# Patient Record
Sex: MALE | Race: WHITE | NOT HISPANIC OR LATINO | URBAN - METROPOLITAN AREA
[De-identification: names, ages, dates, MRNs, and addresses within clinical notes are randomized per-mention and may not be internally consistent; named-entity substitution may affect disease eponyms.]

---

## 2023-09-25 LAB — SARS-COV-2 RESULT: NOT DETECTED

## 2024-05-06 ENCOUNTER — OFFICE VISIT (OUTPATIENT)
Dept: PRIMARY CARE | Facility: CLINIC | Age: 29
End: 2024-05-06
Payer: COMMERCIAL

## 2024-05-06 VITALS
OXYGEN SATURATION: 98 % | HEART RATE: 78 BPM | WEIGHT: 194 LBS | SYSTOLIC BLOOD PRESSURE: 124 MMHG | DIASTOLIC BLOOD PRESSURE: 78 MMHG | RESPIRATION RATE: 16 BRPM

## 2024-05-06 DIAGNOSIS — L05.91 PILONIDAL CYST: ICD-10-CM

## 2024-05-06 DIAGNOSIS — K43.2 INCISIONAL HERNIA, WITHOUT OBSTRUCTION OR GANGRENE: ICD-10-CM

## 2024-05-06 DIAGNOSIS — Z13.220 LIPID SCREENING: ICD-10-CM

## 2024-05-06 DIAGNOSIS — Z11.59 NEED FOR HEPATITIS C SCREENING TEST: ICD-10-CM

## 2024-05-06 DIAGNOSIS — F32.A DEPRESSION, UNSPECIFIED DEPRESSION TYPE: ICD-10-CM

## 2024-05-06 DIAGNOSIS — J45.20 MILD INTERMITTENT ASTHMA WITHOUT COMPLICATION (HHS-HCC): Primary | ICD-10-CM

## 2024-05-06 DIAGNOSIS — Z76.89 ESTABLISHING CARE WITH NEW DOCTOR, ENCOUNTER FOR: ICD-10-CM

## 2024-05-06 PROBLEM — R94.31 PROLONGED QT INTERVAL: Status: RESOLVED | Noted: 2020-03-16 | Resolved: 2024-05-06

## 2024-05-06 PROBLEM — K86.89: Status: ACTIVE | Noted: 2020-03-16

## 2024-05-06 PROBLEM — K85.91 ACUTE NECROTIZING PANCREATITIS (HHS-HCC): Status: RESOLVED | Noted: 2020-03-16 | Resolved: 2024-05-06

## 2024-05-06 PROBLEM — G89.18 ACUTE POSTOPERATIVE PAIN: Status: RESOLVED | Noted: 2020-03-16 | Resolved: 2024-05-06

## 2024-05-06 PROBLEM — D62 ACUTE BLOOD LOSS ANEMIA: Status: RESOLVED | Noted: 2020-03-16 | Resolved: 2024-05-06

## 2024-05-06 PROBLEM — K65.9 ABDOMINAL INFECTION (MULTI): Status: RESOLVED | Noted: 2020-03-25 | Resolved: 2024-05-06

## 2024-05-06 PROBLEM — E43 SEVERE PROTEIN-CALORIE MALNUTRITION (MULTI): Status: RESOLVED | Noted: 2020-04-10 | Resolved: 2024-05-06

## 2024-05-06 PROBLEM — D64.9 ANEMIA: Status: ACTIVE | Noted: 2020-04-13

## 2024-05-06 PROBLEM — E44.0 MODERATE PROTEIN-CALORIE MALNUTRITION (MULTI): Status: RESOLVED | Noted: 2020-03-26 | Resolved: 2024-05-06

## 2024-05-06 PROBLEM — E66.9 OBESE: Status: RESOLVED | Noted: 2019-09-07 | Resolved: 2024-05-06

## 2024-05-06 PROBLEM — Z91.51 HISTORY OF SUICIDE ATTEMPT: Status: RESOLVED | Noted: 2020-03-16 | Resolved: 2024-05-06

## 2024-05-06 PROBLEM — M79.89 LEFT LEG SWELLING: Status: RESOLVED | Noted: 2020-03-16 | Resolved: 2024-05-06

## 2024-05-06 PROBLEM — J45.909 ASTHMA (HHS-HCC): Status: ACTIVE | Noted: 2019-12-31

## 2024-05-06 PROCEDURE — 1036F TOBACCO NON-USER: CPT | Performed by: PHYSICIAN ASSISTANT

## 2024-05-06 PROCEDURE — 99204 OFFICE O/P NEW MOD 45 MIN: CPT | Performed by: PHYSICIAN ASSISTANT

## 2024-05-06 RX ORDER — ALBUTEROL SULFATE 90 UG/1
1-2 AEROSOL, METERED RESPIRATORY (INHALATION) EVERY 6 HOURS PRN
Qty: 18 G | Refills: 5 | Status: SHIPPED | OUTPATIENT
Start: 2024-05-06

## 2024-05-06 RX ORDER — ALBUTEROL SULFATE 90 UG/1
AEROSOL, METERED RESPIRATORY (INHALATION)
COMMUNITY
Start: 2021-09-07 | End: 2024-05-06 | Stop reason: SDUPTHER

## 2024-05-06 ASSESSMENT — ENCOUNTER SYMPTOMS
DEPRESSION: 0
NEUROLOGICAL NEGATIVE: 1
SHORTNESS OF BREATH: 0
RESPIRATORY NEGATIVE: 1
PALPITATIONS: 0
ARTHRALGIAS: 0
NERVOUS/ANXIOUS: 0
GASTROINTESTINAL NEGATIVE: 1
ENDOCRINE NEGATIVE: 1
PSYCHIATRIC NEGATIVE: 1
LOSS OF SENSATION IN FEET: 0
CARDIOVASCULAR NEGATIVE: 1
CONSTITUTIONAL NEGATIVE: 1
OCCASIONAL FEELINGS OF UNSTEADINESS: 0
EYES NEGATIVE: 1
VOMITING: 0
HEADACHES: 0
WHEEZING: 0
BACK PAIN: 0
COUGH: 0
HEMATOLOGIC/LYMPHATIC NEGATIVE: 1
NAUSEA: 0
DIZZINESS: 0
ABDOMINAL PAIN: 0
ALLERGIC/IMMUNOLOGIC NEGATIVE: 1
MUSCULOSKELETAL NEGATIVE: 1

## 2024-05-06 ASSESSMENT — PATIENT HEALTH QUESTIONNAIRE - PHQ9
SUM OF ALL RESPONSES TO PHQ9 QUESTIONS 1 AND 2: 0
1. LITTLE INTEREST OR PLEASURE IN DOING THINGS: NOT AT ALL
2. FEELING DOWN, DEPRESSED OR HOPELESS: NOT AT ALL

## 2024-05-06 NOTE — PROGRESS NOTES
Subjective   Patient ID: Que Cha is a 29 y.o. male who presents for New Patient Visit.    HPI   Patient Que Cha 29 y.o. male is here today to establish care   previous PCP [facility]    single, works as  at [employer]  specialists none   UTD dental and vision - glasses uTD     PMhx significant medical hx asthma, MDD c/b 2 previous suicide attempts, alcohol use d/o - necrotizing pancreatitis   \asthma stable -  albuterol as  needed   Depression - stable - no medications- sees therapist   Hx pilonidal cyst in past  Incisional hernia -  not painful       PShx: abdominal colectomy     Social hx: etoh- none since 2021, no tobacco use, no illicit drug use   No specific diet and exercise- walks   immunizations   FMhx: mother HTN -- father healthy   Past medical, surgical, social, and family history all reviewed       patient states feeling well otherwise  denies fever, chills, N/V, headache, dizziness, CP, SOB, palpitations, edema, numbness, tingling, weakness  A chaperone was offered to the patient for the physical exam /  exam and was declined       Review of Systems   Constitutional: Negative.    HENT: Negative.     Eyes: Negative.    Respiratory: Negative.  Negative for cough, shortness of breath and wheezing.    Cardiovascular: Negative.  Negative for chest pain and palpitations.   Gastrointestinal: Negative.  Negative for abdominal pain, nausea and vomiting.   Endocrine: Negative.    Genitourinary: Negative.    Musculoskeletal: Negative.  Negative for arthralgias and back pain.   Skin: Negative.  Negative for rash.   Allergic/Immunologic: Negative.    Neurological: Negative.  Negative for dizziness and headaches.   Hematological: Negative.    Psychiatric/Behavioral: Negative.  The patient is not nervous/anxious.        Objective   /78 (BP Location: Right arm, Patient Position: Sitting)   Pulse 78   Resp 16   Wt 88 kg (194 lb 0.1 oz)   SpO2 98%     Physical Exam  Vitals and nursing note  reviewed.   Constitutional:       Appearance: Normal appearance.   HENT:      Head: Normocephalic and atraumatic.   Cardiovascular:      Rate and Rhythm: Normal rate and regular rhythm.      Heart sounds: Normal heart sounds.   Pulmonary:      Effort: Pulmonary effort is normal.      Breath sounds: Normal breath sounds.   Abdominal:      General: Bowel sounds are normal.      Palpations: Abdomen is soft.      Comments: ++ abdominal scars and incisional hernia    Musculoskeletal:         General: Normal range of motion.      Cervical back: Neck supple.   Skin:     General: Skin is warm and dry.   Neurological:      General: No focal deficit present.      Mental Status: He is alert and oriented to person, place, and time.   Psychiatric:         Mood and Affect: Mood normal.         Behavior: Behavior normal.         Thought Content: Thought content normal.         Judgment: Judgment normal.       Assessment/Plan   Problem List Items Addressed This Visit       Asthma ([facility]-H[facility]) - Primary    Relevant Medications    albuterol (Ventolin HFA) 90 mcg/actuation inhaler    Depression    Relevant Orders    Comprehensive Metabolic Panel     Other Visit Diagnoses       Establishing care with new doctor, encounter for        Need for hepatitis C screening test        Relevant Orders    Hepatitis C Antibody    Lipid screening        Relevant Orders    Lipid Panel    Incisional hernia, without obstruction or gangrene        Relevant Orders    Referral to General Surgery    Pilonidal cyst              Est/ asthma/ depression / incisional hernia - pilonidal cyst   -  patient stable and healthy  -  discussed healthy diet and exercise plan   -  continue medications as directed, SEs, risks and options discussed   -  f/u with specialists as directed   -  dental and vision exams, f/u as directed   -  Labs  ordered today, will call when results are available   -Vaccinations recommended today:  Tdap- due at [facility]   -Follow-up annual exam  advised   -Follow-up in one week to discuss all results    Total appt time today was 45+ minutes. Time included preparing to see the pt, obtaining the hx, performing the medically necessary appropriate physical exam, counseling & educating the pt, ordering tests & procedures, referring & communicating w/other providers, independently interpreting results & communicating the results to the pt, care, coordination & documenting clinical information in the medical record.         [employer]

## 2024-05-09 ENCOUNTER — LAB (OUTPATIENT)
Dept: LAB | Facility: LAB | Age: 29
End: 2024-05-09
Payer: COMMERCIAL

## 2024-05-09 DIAGNOSIS — F32.A DEPRESSION, UNSPECIFIED DEPRESSION TYPE: ICD-10-CM

## 2024-05-09 DIAGNOSIS — Z13.220 LIPID SCREENING: ICD-10-CM

## 2024-05-09 DIAGNOSIS — Z11.59 NEED FOR HEPATITIS C SCREENING TEST: ICD-10-CM

## 2024-05-09 LAB
ALBUMIN SERPL BCP-MCNC: 4.4 G/DL (ref 3.4–5)
ALP SERPL-CCNC: 65 U/L (ref 33–120)
ALT SERPL W P-5'-P-CCNC: 12 U/L (ref 10–52)
ANION GAP SERPL CALC-SCNC: 11 MMOL/L (ref 10–20)
AST SERPL W P-5'-P-CCNC: 12 U/L (ref 9–39)
BILIRUB SERPL-MCNC: 0.5 MG/DL (ref 0–1.2)
BUN SERPL-MCNC: 11 MG/DL (ref 6–23)
CALCIUM SERPL-MCNC: 9.6 MG/DL (ref 8.6–10.3)
CHLORIDE SERPL-SCNC: 103 MMOL/L (ref 98–107)
CHOLEST SERPL-MCNC: 148 MG/DL (ref 0–199)
CHOLESTEROL/HDL RATIO: 4.2
CO2 SERPL-SCNC: 30 MMOL/L (ref 21–32)
COTININE UR QL SCN: NEGATIVE
CREAT SERPL-MCNC: 0.96 MG/DL (ref 0.5–1.3)
EGFRCR SERPLBLD CKD-EPI 2021: >90 ML/MIN/1.73M*2
GLUCOSE SERPL-MCNC: 91 MG/DL (ref 74–99)
HCV AB SER QL: NONREACTIVE
HDLC SERPL-MCNC: 35.1 MG/DL
LDLC SERPL CALC-MCNC: 84 MG/DL
NON HDL CHOLESTEROL: 113 MG/DL (ref 0–149)
POTASSIUM SERPL-SCNC: 4.3 MMOL/L (ref 3.5–5.3)
PROT SERPL-MCNC: 7.1 G/DL (ref 6.4–8.2)
SODIUM SERPL-SCNC: 140 MMOL/L (ref 136–145)
TRIGL SERPL-MCNC: 145 MG/DL (ref 0–149)
VLDL: 29 MG/DL (ref 0–40)

## 2024-05-09 PROCEDURE — 80053 COMPREHEN METABOLIC PANEL: CPT

## 2024-05-09 PROCEDURE — 36415 COLL VENOUS BLD VENIPUNCTURE: CPT

## 2024-05-09 PROCEDURE — 80061 LIPID PANEL: CPT

## 2024-05-09 PROCEDURE — 86803 HEPATITIS C AB TEST: CPT

## 2024-05-21 NOTE — RESULT ENCOUNTER NOTE
Please call and inform pt that all labs are stable - continue low fat cholesterol low sugar healthy diet with exercise multivitamin and vitamin D daily   And call office with any further questions or concerns

## 2024-05-22 ENCOUNTER — OFFICE VISIT (OUTPATIENT)
Dept: SURGERY | Facility: CLINIC | Age: 29
End: 2024-05-22
Payer: COMMERCIAL

## 2024-05-22 VITALS
HEIGHT: 71 IN | SYSTOLIC BLOOD PRESSURE: 112 MMHG | WEIGHT: 187 LBS | TEMPERATURE: 98.1 F | DIASTOLIC BLOOD PRESSURE: 74 MMHG | BODY MASS INDEX: 26.18 KG/M2 | HEART RATE: 102 BPM

## 2024-05-22 DIAGNOSIS — K43.2 INCISIONAL HERNIA, WITHOUT OBSTRUCTION OR GANGRENE: Primary | ICD-10-CM

## 2024-05-22 PROCEDURE — 99214 OFFICE O/P EST MOD 30 MIN: CPT | Performed by: SURGERY

## 2024-05-22 ASSESSMENT — PAIN SCALES - GENERAL: PAINLEVEL: 0-NO PAIN

## 2024-05-22 ASSESSMENT — ENCOUNTER SYMPTOMS: DEPRESSION: 0

## 2024-05-22 NOTE — PATIENT INSTRUCTIONS
Regarding your hernia I do recommend surgical repair    Before surgery however I would like for you to get a CT scan to further evaluate your abdominal wall    Please call the number listed below to schedule the CT scan.    Give my office a call back after you have gotten the CAT scan

## 2024-05-22 NOTE — PROGRESS NOTES
Subjective   Patient ID: Que Cha is a 29 y.o. male who presents for Hernia.  HPI  Patient is a 29-year-old gentleman with complex past surgical history.  Apparently he developed necrotizing alcohol induced pancreatitis and was treated back in 2020 at the [facility].  It appears that he underwent laparotomy with pancreatectomy and placement of a drain.  He states that he was hospitalized for roughly 1 month and subsequent rehab for 4 months following this procedure.    He is referred for evaluation and treatment of a fairly asymptomatic incisional hernia.  He is getting back to his baseline health and after going to a gym he has noted a bulge involving his abdominal wall.  He denies any pain, nausea or vomiting.    Review of Systems     On review of systems patient denies any weight loss, fever, change in bowel habits, melena, hematochezia, coronary artery disease, diabetes, hypertension, TIA/CVA, bleeding, jaundice, , hepatitis.    Patient does not smoke. Patient does not drink     Works at [employer]     Past Medical History:   Diagnosis Date    Abdominal infection (Multi) 03/25/2020    Last Assessment & Plan:    Formatting of this note might be different from the original.      Assessment: Intraoperative cultures growing bacteroides species on aerobic cultures and GNB. Blood cultures positive for Bacteroides species and Clostridium perfringens. Repeat Blood cultures on 3/22 showing NGTD. Blood cultures 3/16 +Clostridium perfringens. PICC placed 4/1.    PLAN:    -Follow up repeat    Acute blood loss anemia 03/16/2020    Last Assessment & Plan:    Formatting of this note might be different from the original.      Assessment: EBL 200ml from OR 3/18 requiring  6U FFP   PLAN:    -Trend CBC   -Transfuse for Hgb <7    Acute necrotizing pancreatitis (HHS-HCC) 03/16/2020    Last Assessment & Plan:    Formatting of this note might be different from the original.   Assessment: s/p exp lap with washout and I&D. Patient  no longer febrile at night, WBC stable   PLAN:    - Bacteroides from intra-abdominal cultures   - Continue Zosyn per ID    Left leg swelling 03/16/2020    Last Assessment & Plan:    Formatting of this note might be different from the original.      Assessment: Worse edema on left side versus right.   PLAN:    -- DVT scan today    Moderate protein-calorie malnutrition (Multi) 03/26/2020    Last Assessment & Plan:    Formatting of this note might be different from the original.      Assessment: Corpak incidentally removed 3/25   PLAN:    -Continue GI soft diet    -Appreciate nutrition recs    Obese 09/07/2019    Severe protein-calorie malnutrition (Multi) 04/10/2020    Last Assessment & Plan:    Formatting of this note might be different from the original.   Assessment: On enteral nutrition. Minimal PO intake    PLAN:    -TF to goal   -GI soft diet    -Creon 36 with meals and snacks    -Appreciate nutrition recs   PMH:  asthma,  depression,      Past Surgical History:   Procedure Laterality Date    US GUIDED ABSCESS DRAIN  5/23/2022    US GUIDED ABSCESS DRAIN 5/23/2022 [facility] CLINICAL LEGACY    IR drainage of a MRSA left sided retroperitoneal abscess      Objective     Physical Exam    Well-nourished, well-developed. In no distress  Alert and oriented x 3  Lungs are clear to auscultation bilaterally.  Cardiac exam is regular rhythm and rate.  Abdomen is soft nontender nondistended.  Large midline incision from xyphoid to umbilicus.    Obvious hernia in the epigastrium.  Smaller hernias around the umbilicus.  All reduce easily  Neurologic exam is without focal deficit.     Assessment/Plan   Diagnoses and all orders for this visit:  Incisional hernia, without obstruction or gangrene  -     Referral to General Surgery  -     Case Request Operating Room: Repair Ventral Hernia Robot-Assisted; Standing  -     Request for Pre-Admission Testing Visit; Future  -     CT abdomen pelvis wo IV contrast; Future  Other orders  -      Place in outpatient/hospital ambulatory surgery; Standing  -     Full code; Standing    Impression; Incisional hernia. Recommend Robotic incisional hernia repair with mesh.    Will also order ct abdomen/pelvis to further evaluate his abdominal wall prior to surgery

## 2024-05-22 NOTE — LETTER
May 22, 2024     Berta Fenton PA-C  [address] [place] [facility]  [place] [facility] [address]    Patient: Que Cha   YOB: 1995   Date of Visit: 5/22/2024       Dear Dr. Berta Fenton PA-C:    Thank you for referring Que Cha to me for evaluation. Below are my notes for this consultation.  If you have questions, please do not hesitate to call me. I look forward to following your patient along with you.       Sincerely,     Juan Antonio Stokes MD      CC: No Recipients  ______________________________________________________________________________________    Subjective   Patient ID: Que Cha is a 29 y.o. male who presents for Hernia.  HPI  Patient is a 29-year-old gentleman with complex past surgical history.  Apparently he developed necrotizing alcohol induced pancreatitis and was treated back in 2020 at the [facility].  It appears that he underwent laparotomy with pancreatectomy and placement of a drain.  He states that he was hospitalized for roughly 1 month and subsequent rehab for 4 months following this procedure.    He is referred for evaluation and treatment of a fairly asymptomatic incisional hernia.  He is getting back to his baseline health and after going to a gym he has noted a bulge involving his abdominal wall.  He denies any pain, nausea or vomiting.    Review of Systems     On review of systems patient denies any weight loss, fever, change in bowel habits, melena, hematochezia, coronary artery disease, diabetes, hypertension, TIA/CVA, bleeding, jaundice, , hepatitis.    Patient does not smoke. Patient does not drink     Works at      Past Medical History:   Diagnosis Date   • Abdominal infection (Multi) 03/25/2020    Last Assessment & Plan:    Formatting of this note might be different from the original.      Assessment: Intraoperative cultures growing bacteroides species on aerobic cultures and GNB. Blood cultures  positive for Bacteroides species and Clostridium perfringens. Repeat Blood cultures on 3/22 showing NGTD. Blood cultures 3/16 +Clostridium perfringens. PICC placed 4/1.    PLAN:    -Follow up repeat   • Acute blood loss anemia 03/16/2020    Last Assessment & Plan:    Formatting of this note might be different from the original.      Assessment: EBL 200ml from OR 3/18 requiring  6U FFP   PLAN:    -Trend CBC   -Transfuse for Hgb <7   • Acute necrotizing pancreatitis ([facility]HS-HCC) 03/16/2020    Last Assessment & Plan:    Formatting of this note might be different from the original.   Assessment: s/p exp lap with washout and I&D. Patient no longer febrile at night, WBC stable   PLAN:    - Bacteroides from intra-abdominal cultures   - Continue Zosyn per ID   • Left leg swelling 03/16/2020    Last Assessment & Plan:    Formatting of this note might be different from the original.      Assessment: Worse edema on left side versus right.   PLAN:    -- DVT scan today   • Moderate protein-calorie malnutrition (Multi) 03/26/2020    Last Assessment & Plan:    Formatting of this note might be different from the original.      Assessment: Corpak incidentally removed 3/25   PLAN:    -Continue GI soft diet    -Appreciate nutrition recs   • Obese 09/07/2019   • Severe protein-calorie malnutrition (Multi) 04/10/2020    Last Assessment & Plan:    Formatting of this note might be different from the original.   Assessment: On enteral nutrition. Minimal PO intake    PLAN:    -TF to goal   -GI soft diet    -Creon 36 with meals and snacks    -Appreciate nutrition recs   PMH:  asthma,  depression,      Past Surgical History:   Procedure Laterality Date   • US GUIDED ABSCESS DRAIN  5/23/2022    US GUIDED ABSCESS DRAIN 5/23/2022 [facility] CLINICAL LEGACY    IR drainage of a MRSA left sided retroperitoneal abscess      Objective     Physical Exam    Well-nourished, well-developed. In no distress  Alert and oriented x 3  Lungs are clear to auscultation  bilaterally.  Cardiac exam is regular rhythm and rate.  Abdomen is soft nontender nondistended.  Large midline incision from xyphoid to umbilicus.    Obvious hernia in the epigastrium.  Smaller hernias around the umbilicus.  All reduce easily  Neurologic exam is without focal deficit.     Assessment/Plan   Diagnoses and all orders for this visit:  Incisional hernia, without obstruction or gangrene  -     Referral to General Surgery  -     Case Request Operating Room: Repair Ventral Hernia Robot-Assisted; Standing  -     Request for Pre-Admission Testing Visit; Future  -     CT abdomen pelvis wo IV contrast; Future  Other orders  -     Place in outpatient/hospital ambulatory surgery; Standing  -     Full code; Standing    Impression; Incisional hernia. Recommend Robotic incisional hernia repair with mesh.    Will also order ct abdomen/pelvis to further evaluate his abdominal wall prior to surgery

## 2024-06-14 ENCOUNTER — HOSPITAL ENCOUNTER (OUTPATIENT)
Dept: RADIOLOGY | Facility: HOSPITAL | Age: 29
Discharge: HOME | End: 2024-06-14
Payer: COMMERCIAL

## 2024-06-14 DIAGNOSIS — K43.2 INCISIONAL HERNIA, WITHOUT OBSTRUCTION OR GANGRENE: ICD-10-CM

## 2024-06-14 PROCEDURE — 74176 CT ABD & PELVIS W/O CONTRAST: CPT

## 2024-08-06 ENCOUNTER — LAB (OUTPATIENT)
Dept: LAB | Facility: LAB | Age: 29
End: 2024-08-06
Payer: COMMERCIAL

## 2024-08-06 ENCOUNTER — APPOINTMENT (OUTPATIENT)
Dept: PRIMARY CARE | Facility: CLINIC | Age: 29
End: 2024-08-06
Payer: COMMERCIAL

## 2024-08-06 ENCOUNTER — HOSPITAL ENCOUNTER (OUTPATIENT)
Dept: RADIOLOGY | Facility: CLINIC | Age: 29
Discharge: HOME | End: 2024-08-06
Payer: COMMERCIAL

## 2024-08-06 VITALS
SYSTOLIC BLOOD PRESSURE: 118 MMHG | HEIGHT: 71 IN | OXYGEN SATURATION: 98 % | BODY MASS INDEX: 25.43 KG/M2 | DIASTOLIC BLOOD PRESSURE: 74 MMHG | WEIGHT: 181.66 LBS | HEART RATE: 57 BPM

## 2024-08-06 DIAGNOSIS — N50.89 ENLARGED TESTICLE: Primary | ICD-10-CM

## 2024-08-06 DIAGNOSIS — N50.89 ENLARGED TESTICLE: ICD-10-CM

## 2024-08-06 LAB
ERYTHROCYTE [DISTWIDTH] IN BLOOD BY AUTOMATED COUNT: 14.6 % (ref 11.5–14.5)
HCT VFR BLD AUTO: 45.8 % (ref 41–52)
HGB BLD-MCNC: 14.5 G/DL (ref 13.5–17.5)
MCH RBC QN AUTO: 28.6 PG (ref 26–34)
MCHC RBC AUTO-ENTMCNC: 31.7 G/DL (ref 32–36)
MCV RBC AUTO: 90 FL (ref 80–100)
NRBC BLD-RTO: 0 /100 WBCS (ref 0–0)
PLATELET # BLD AUTO: 231 X10*3/UL (ref 150–450)
POC APPEARANCE, URINE: CLEAR
POC BILIRUBIN, URINE: NEGATIVE
POC BLOOD, URINE: NEGATIVE
POC COLOR, URINE: YELLOW
POC GLUCOSE, URINE: NEGATIVE MG/DL
POC KETONES, URINE: NEGATIVE MG/DL
POC LEUKOCYTES, URINE: NEGATIVE
POC NITRITE,URINE: NEGATIVE
POC PH, URINE: 6 PH
POC PROTEIN, URINE: NEGATIVE MG/DL
POC SPECIFIC GRAVITY, URINE: >=1.03
POC UROBILINOGEN, URINE: 0.2 EU/DL
RBC # BLD AUTO: 5.07 X10*6/UL (ref 4.5–5.9)
WBC # BLD AUTO: 7.6 X10*3/UL (ref 4.4–11.3)

## 2024-08-06 PROCEDURE — 76870 US EXAM SCROTUM: CPT | Performed by: RADIOLOGY

## 2024-08-06 PROCEDURE — 36415 COLL VENOUS BLD VENIPUNCTURE: CPT

## 2024-08-06 PROCEDURE — 81003 URINALYSIS AUTO W/O SCOPE: CPT | Performed by: PHYSICIAN ASSISTANT

## 2024-08-06 PROCEDURE — 99214 OFFICE O/P EST MOD 30 MIN: CPT | Performed by: PHYSICIAN ASSISTANT

## 2024-08-06 PROCEDURE — 93975 VASCULAR STUDY: CPT

## 2024-08-06 PROCEDURE — 3008F BODY MASS INDEX DOCD: CPT | Performed by: PHYSICIAN ASSISTANT

## 2024-08-06 PROCEDURE — 85027 COMPLETE CBC AUTOMATED: CPT

## 2024-08-06 ASSESSMENT — ENCOUNTER SYMPTOMS
EYES NEGATIVE: 1
DEPRESSION: 0
HEMATOLOGIC/LYMPHATIC NEGATIVE: 1
PSYCHIATRIC NEGATIVE: 1
DIZZINESS: 0
MUSCULOSKELETAL NEGATIVE: 1
OCCASIONAL FEELINGS OF UNSTEADINESS: 0
NAUSEA: 0
RESPIRATORY NEGATIVE: 1
BACK PAIN: 0
NEUROLOGICAL NEGATIVE: 1
NERVOUS/ANXIOUS: 0
HEADACHES: 0
WHEEZING: 0
ALLERGIC/IMMUNOLOGIC NEGATIVE: 1
ENDOCRINE NEGATIVE: 1
PALPITATIONS: 0
LOSS OF SENSATION IN FEET: 0
COUGH: 0
ABDOMINAL PAIN: 0
ARTHRALGIAS: 0
VOMITING: 0
CONSTITUTIONAL NEGATIVE: 1
SHORTNESS OF BREATH: 0

## 2024-08-06 NOTE — H&P (VIEW-ONLY)
"Subjective   Patient ID: Que Cha is a 29 y.o. male who presents for Groin Swelling.    HPI     Patient Que Cha 29 y.o. male is here today for enlarged left testicle - x 6 months   Normal urine and bowels - no pain- no wt change - wt is stable   No back or  flank pain  No ED        patient states feeling well otherwise  denies fever, chills, N/V, headache, dizziness, CP, SOB, palpitations, edema, numbness, tingling, weakness  A chaperone was offered to the patient for the physical exam /  exam and girlfriend wil       Review of Systems   Constitutional: Negative.    HENT: Negative.     Eyes: Negative.    Respiratory: Negative.  Negative for cough, shortness of breath and wheezing.    Cardiovascular:  Negative for chest pain and palpitations.   Gastrointestinal:  Negative for abdominal pain, nausea and vomiting.   Endocrine: Negative.    Genitourinary: Negative.    Musculoskeletal: Negative.  Negative for arthralgias and back pain.   Skin: Negative.  Negative for rash.   Allergic/Immunologic: Negative.    Neurological: Negative.  Negative for dizziness and headaches.   Hematological: Negative.    Psychiatric/Behavioral: Negative.  The patient is not nervous/anxious.        Objective   /74 (BP Location: Right arm, Patient Position: Sitting)   Pulse 57   Ht 1.803 m (5' 11\")   Wt 82.4 kg (181 lb 10.5 oz)   SpO2 98%   BMI 25.34 kg/m²     Physical Exam  Vitals and nursing note reviewed.   Constitutional:       Appearance: Normal appearance.   HENT:      Head: Normocephalic and atraumatic.   Cardiovascular:      Rate and Rhythm: Normal rate and regular rhythm.   Pulmonary:      Effort: Pulmonary effort is normal.      Breath sounds: Normal breath sounds.   Abdominal:      General: Bowel sounds are normal. There is no distension.      Palpations: Abdomen is soft. There is no mass.      Tenderness: There is no abdominal tenderness. There is no right CVA tenderness, left CVA tenderness, guarding " or rebound.      Hernia: No hernia is present.   Musculoskeletal:         General: Normal range of motion.      Cervical back: Neck supple.   Skin:     General: Skin is warm and dry.   Neurological:      General: No focal deficit present.      Mental Status: He is alert and oriented to person, place, and time.   Psychiatric:         Mood and Affect: Mood normal.         Behavior: Behavior normal.         Thought Content: Thought content normal.         Judgment: Judgment normal.       Assessment/Plan   Problem List Items Addressed This Visit    None  Visit Diagnoses       Enlarged testicle    -  Primary    Relevant Orders    US scrotum    CBC          complexity visit of 30   minutes face-to-face with at least half of the time discussing  Results of my examination, clinical findings, impression and diagnoses discussed with the patient as well as results of the latest test/lab work. The patient was in agreement with the plan and verbalized a good understanding of instructions and plans for treatment. At the end of the visit today, the patient felt that all questions had been answered satisfactorily. The patient was pleased with the visit and very appreciative for the care rendered.

## 2024-08-20 ENCOUNTER — APPOINTMENT (OUTPATIENT)
Dept: UROLOGY | Facility: CLINIC | Age: 29
End: 2024-08-20
Payer: COMMERCIAL

## 2024-08-21 ENCOUNTER — ANESTHESIA EVENT (OUTPATIENT)
Dept: OPERATING ROOM | Facility: HOSPITAL | Age: 29
End: 2024-08-21
Payer: COMMERCIAL

## 2024-08-22 ENCOUNTER — ANESTHESIA (OUTPATIENT)
Dept: OPERATING ROOM | Facility: HOSPITAL | Age: 29
End: 2024-08-22
Payer: COMMERCIAL

## 2024-08-22 ENCOUNTER — HOSPITAL ENCOUNTER (OUTPATIENT)
Facility: HOSPITAL | Age: 29
Setting detail: OUTPATIENT SURGERY
Discharge: HOME | End: 2024-08-22
Attending: SURGERY | Admitting: SURGERY
Payer: COMMERCIAL

## 2024-08-22 VITALS
RESPIRATION RATE: 14 BRPM | DIASTOLIC BLOOD PRESSURE: 73 MMHG | OXYGEN SATURATION: 98 % | HEART RATE: 50 BPM | SYSTOLIC BLOOD PRESSURE: 118 MMHG | BODY MASS INDEX: 24.81 KG/M2 | HEIGHT: 71 IN | TEMPERATURE: 97.5 F | WEIGHT: 177.25 LBS

## 2024-08-22 DIAGNOSIS — K43.2 INCISIONAL HERNIA, WITHOUT OBSTRUCTION OR GANGRENE: Primary | ICD-10-CM

## 2024-08-22 PROCEDURE — 2780000003 HC OR 278 NO HCPCS: Performed by: SURGERY

## 2024-08-22 PROCEDURE — 3700000001 HC GENERAL ANESTHESIA TIME - INITIAL BASE CHARGE: Performed by: SURGERY

## 2024-08-22 PROCEDURE — 7100000001 HC RECOVERY ROOM TIME - INITIAL BASE CHARGE: Performed by: SURGERY

## 2024-08-22 PROCEDURE — 2500000004 HC RX 250 GENERAL PHARMACY W/ HCPCS (ALT 636 FOR OP/ED)

## 2024-08-22 PROCEDURE — 2500000004 HC RX 250 GENERAL PHARMACY W/ HCPCS (ALT 636 FOR OP/ED): Performed by: SURGERY

## 2024-08-22 PROCEDURE — 49593 RPR AA HRN 1ST 3-10 RDC: CPT | Performed by: SURGERY

## 2024-08-22 PROCEDURE — 2500000005 HC RX 250 GENERAL PHARMACY W/O HCPCS: Performed by: ANESTHESIOLOGY

## 2024-08-22 PROCEDURE — 7100000010 HC PHASE TWO TIME - EACH INCREMENTAL 1 MINUTE: Performed by: SURGERY

## 2024-08-22 PROCEDURE — 2720000007 HC OR 272 NO HCPCS: Performed by: SURGERY

## 2024-08-22 PROCEDURE — C1781 MESH (IMPLANTABLE): HCPCS | Performed by: SURGERY

## 2024-08-22 PROCEDURE — 0751T DGTZ GLS MCRSCP SLD LEVEL II: CPT | Mod: TC,AHULAB | Performed by: SURGERY

## 2024-08-22 PROCEDURE — 7100000009 HC PHASE TWO TIME - INITIAL BASE CHARGE: Performed by: SURGERY

## 2024-08-22 PROCEDURE — 3700000002 HC GENERAL ANESTHESIA TIME - EACH INCREMENTAL 1 MINUTE: Performed by: SURGERY

## 2024-08-22 PROCEDURE — 2500000004 HC RX 250 GENERAL PHARMACY W/ HCPCS (ALT 636 FOR OP/ED): Performed by: ANESTHESIOLOGY

## 2024-08-22 PROCEDURE — 2500000001 HC RX 250 WO HCPCS SELF ADMINISTERED DRUGS (ALT 637 FOR MEDICARE OP): Performed by: ANESTHESIOLOGY

## 2024-08-22 PROCEDURE — 2500000005 HC RX 250 GENERAL PHARMACY W/O HCPCS

## 2024-08-22 PROCEDURE — 3600000018 HC OR TIME - INITIAL BASE CHARGE - PROCEDURE LEVEL SIX: Performed by: SURGERY

## 2024-08-22 PROCEDURE — 2500000005 HC RX 250 GENERAL PHARMACY W/O HCPCS: Performed by: SURGERY

## 2024-08-22 PROCEDURE — 3600000017 HC OR TIME - EACH INCREMENTAL 1 MINUTE - PROCEDURE LEVEL SIX: Performed by: SURGERY

## 2024-08-22 PROCEDURE — 7100000002 HC RECOVERY ROOM TIME - EACH INCREMENTAL 1 MINUTE: Performed by: SURGERY

## 2024-08-22 DEVICE — VENTRALEX ST HERNIA PATCH
Type: IMPLANTABLE DEVICE | Site: ABDOMEN | Status: FUNCTIONAL
Brand: VENTRALEX ST HERNIA PATCH

## 2024-08-22 RX ORDER — PROMETHAZINE HYDROCHLORIDE 25 MG/ML
6.25 INJECTION, SOLUTION INTRAMUSCULAR; INTRAVENOUS ONCE AS NEEDED
Status: DISCONTINUED | OUTPATIENT
Start: 2024-08-22 | End: 2024-08-22 | Stop reason: HOSPADM

## 2024-08-22 RX ORDER — FENTANYL CITRATE 50 UG/ML
INJECTION, SOLUTION INTRAMUSCULAR; INTRAVENOUS AS NEEDED
Status: DISCONTINUED | OUTPATIENT
Start: 2024-08-22 | End: 2024-08-22

## 2024-08-22 RX ORDER — ROCURONIUM BROMIDE 10 MG/ML
INJECTION, SOLUTION INTRAVENOUS AS NEEDED
Status: DISCONTINUED | OUTPATIENT
Start: 2024-08-22 | End: 2024-08-22

## 2024-08-22 RX ORDER — LIDOCAINE HYDROCHLORIDE 10 MG/ML
0.1 INJECTION, SOLUTION EPIDURAL; INFILTRATION; INTRACAUDAL; PERINEURAL ONCE
Status: DISCONTINUED | OUTPATIENT
Start: 2024-08-22 | End: 2024-08-22 | Stop reason: HOSPADM

## 2024-08-22 RX ORDER — WATER 1 ML/ML
IRRIGANT IRRIGATION AS NEEDED
Status: DISCONTINUED | OUTPATIENT
Start: 2024-08-22 | End: 2024-08-22 | Stop reason: HOSPADM

## 2024-08-22 RX ORDER — ACETAMINOPHEN 325 MG/1
650 TABLET ORAL EVERY 4 HOURS PRN
Status: DISCONTINUED | OUTPATIENT
Start: 2024-08-22 | End: 2024-08-22 | Stop reason: HOSPADM

## 2024-08-22 RX ORDER — MIDAZOLAM HYDROCHLORIDE 1 MG/ML
INJECTION INTRAMUSCULAR; INTRAVENOUS AS NEEDED
Status: DISCONTINUED | OUTPATIENT
Start: 2024-08-22 | End: 2024-08-22

## 2024-08-22 RX ORDER — OXYCODONE HYDROCHLORIDE 5 MG/1
5 TABLET ORAL EVERY 6 HOURS PRN
Qty: 12 TABLET | Refills: 0 | Status: SHIPPED | OUTPATIENT
Start: 2024-08-22

## 2024-08-22 RX ORDER — OXYCODONE HYDROCHLORIDE 5 MG/1
5 TABLET ORAL EVERY 4 HOURS PRN
Status: DISCONTINUED | OUTPATIENT
Start: 2024-08-22 | End: 2024-08-22 | Stop reason: HOSPADM

## 2024-08-22 RX ORDER — SODIUM CHLORIDE, SODIUM LACTATE, POTASSIUM CHLORIDE, CALCIUM CHLORIDE 600; 310; 30; 20 MG/100ML; MG/100ML; MG/100ML; MG/100ML
100 INJECTION, SOLUTION INTRAVENOUS CONTINUOUS
Status: DISCONTINUED | OUTPATIENT
Start: 2024-08-22 | End: 2024-08-22 | Stop reason: HOSPADM

## 2024-08-22 RX ORDER — CEFAZOLIN 1 G/1
INJECTION, POWDER, FOR SOLUTION INTRAVENOUS AS NEEDED
Status: DISCONTINUED | OUTPATIENT
Start: 2024-08-22 | End: 2024-08-22

## 2024-08-22 RX ORDER — KETOROLAC TROMETHAMINE 30 MG/ML
INJECTION, SOLUTION INTRAMUSCULAR; INTRAVENOUS AS NEEDED
Status: DISCONTINUED | OUTPATIENT
Start: 2024-08-22 | End: 2024-08-22

## 2024-08-22 RX ORDER — PROPOFOL 10 MG/ML
INJECTION, EMULSION INTRAVENOUS AS NEEDED
Status: DISCONTINUED | OUTPATIENT
Start: 2024-08-22 | End: 2024-08-22

## 2024-08-22 RX ORDER — ONDANSETRON HYDROCHLORIDE 2 MG/ML
INJECTION, SOLUTION INTRAVENOUS AS NEEDED
Status: DISCONTINUED | OUTPATIENT
Start: 2024-08-22 | End: 2024-08-22

## 2024-08-22 RX ORDER — LIDOCAINE HYDROCHLORIDE 20 MG/ML
INJECTION, SOLUTION INFILTRATION; PERINEURAL AS NEEDED
Status: DISCONTINUED | OUTPATIENT
Start: 2024-08-22 | End: 2024-08-22

## 2024-08-22 SDOH — HEALTH STABILITY: MENTAL HEALTH: CURRENT SMOKER: 0

## 2024-08-22 ASSESSMENT — PAIN - FUNCTIONAL ASSESSMENT
PAIN_FUNCTIONAL_ASSESSMENT: 0-10

## 2024-08-22 ASSESSMENT — PAIN SCALES - GENERAL
PAINLEVEL_OUTOF10: 5 - MODERATE PAIN
PAINLEVEL_OUTOF10: 7
PAINLEVEL_OUTOF10: 0 - NO PAIN
PAINLEVEL_OUTOF10: 3
PAINLEVEL_OUTOF10: 3
PAINLEVEL_OUTOF10: 7
PAINLEVEL_OUTOF10: 7
PAINLEVEL_OUTOF10: 3
PAINLEVEL_OUTOF10: 7
PAINLEVEL_OUTOF10: 3
PAINLEVEL_OUTOF10: 7

## 2024-08-22 ASSESSMENT — COLUMBIA-SUICIDE SEVERITY RATING SCALE - C-SSRS
6. HAVE YOU EVER DONE ANYTHING, STARTED TO DO ANYTHING, OR PREPARED TO DO ANYTHING TO END YOUR LIFE?: NO
2. HAVE YOU ACTUALLY HAD ANY THOUGHTS OF KILLING YOURSELF?: NO
1. IN THE PAST MONTH, HAVE YOU WISHED YOU WERE DEAD OR WISHED YOU COULD GO TO SLEEP AND NOT WAKE UP?: NO

## 2024-08-22 NOTE — PERIOPERATIVE NURSING NOTE
0907 Arrival to PACU. Complaining of 7/10 abdominal pain, ice pack applied. Incisions x2 to abdomen sealed with dermabond cdi.     1045 Dr. Stokes at bedside to speak to patient. Girlfriend updated via phone call. Tolerating PO. States pain is down to 3/10.     1100 Transferred to Phase 2.

## 2024-08-22 NOTE — ANESTHESIA PREPROCEDURE EVALUATION
Patient: Que Cha    Procedure Information       Date/Time: 08/22/24 0730    Procedure: Robotic Ventral Hernia Repair; Mesh Placement    Location: [facility] 08 / Virtual [facility]    Surgeons: Juan Antonio Stokes MD            Relevant Problems   Pulmonary   (+) Asthma (HHS-HCC)      Neuro   (+) Depression      Hematology   (+) Anemia       Clinical information reviewed:                    Past Medical History:   Diagnosis Date    Abdominal infection (Multi) 03/25/2020    Last Assessment & Plan:    Formatting of this note might be different from the original.      Assessment: Intraoperative cultures growing bacteroides species on aerobic cultures and GNB. Blood cultures positive for Bacteroides species and Clostridium perfringens. Repeat Blood cultures on 3/22 showing NGTD. Blood cultures 3/16 +Clostridium perfringens. PICC placed 4/1.    PLAN:    -Follow up repeat    Acute blood loss anemia 03/16/2020    Last Assessment & Plan:    Formatting of this note might be different from the original.      Assessment: EBL 200ml from OR 3/18 requiring  6U FFP   PLAN:    -Trend CBC   -Transfuse for Hgb <7    Acute necrotizing pancreatitis ([facility]) 03/16/2020    Last Assessment & Plan:    Formatting of this note might be different from the original.   Assessment: s/p exp lap with washout and I&D. Patient no longer febrile at night, WBC stable   PLAN:    - Bacteroides from intra-abdominal cultures   - Continue Zosyn per ID    Left leg swelling 03/16/2020    Last Assessment & Plan:    Formatting of this note might be different from the original.      Assessment: Worse edema on left side versus right.   PLAN:    -- DVT scan today    Moderate protein-calorie malnutrition (Multi) 03/26/2020    Last Assessment & Plan:    Formatting of this note might be different from the original.      Assessment: Corpak incidentally removed 3/25   PLAN:    -Continue GI soft diet    -Appreciate nutrition recs    Obese 09/07/2019    Severe  "protein-calorie malnutrition (Multi) 04/10/2020    Last Assessment & Plan:    Formatting of this note might be different from the original.   Assessment: On enteral nutrition. Minimal PO intake    PLAN:    -TF to goal   -GI soft diet    -Creon 36 with meals and snacks    -Appreciate nutrition recs      Past Surgical History:   Procedure Laterality Date    US GUIDED ABSCESS DRAIN  5/23/2022    US GUIDED ABSCESS DRAIN 5/23/2022 [facility] CLINICAL LEGACY     Social History     Tobacco Use    Smoking status: Former     Types: Cigarettes    Smokeless tobacco: Never   Substance Use Topics    Alcohol use: Not Currently    Drug use: Yes     Types: Marijuana      Current Outpatient Medications   Medication Instructions    albuterol (Ventolin HFA) 90 mcg/actuation inhaler 1-2 puffs, inhalation, Every 6 hours PRN      Allergies   Allergen Reactions    Mold Unknown        Chemistry    Lab Results   Component Value Date/Time     05/09/2024 1424    K 4.3 05/09/2024 1424     05/09/2024 1424    CO2 30 05/09/2024 1424    BUN 11 05/09/2024 1424    CREATININE 0.96 05/09/2024 1424    Lab Results   Component Value Date/Time    CALCIUM 9.6 05/09/2024 1424    ALKPHOS 65 05/09/2024 1424    AST 12 05/09/2024 1424    ALT 12 05/09/2024 1424    BILITOT 0.5 05/09/2024 1424          Lab Results   Component Value Date    HGBA1C 5.2 02/25/2020     Lab Results   Component Value Date/Time    WBC 7.6 08/06/2024 1031    HGB 14.5 08/06/2024 1031    HCT 45.8 08/06/2024 1031     08/06/2024 1031     Lab Results   Component Value Date/Time    PROTIME 15.0 (H) 05/21/2022 0515    INR 1.3 (H) 05/21/2022 0515     No results found for: \"ABORH\"  No results found for this or any previous visit (from the past 4464 hour(s)).  No results found for this or any previous visit from the past 1095 days.       Visit Vitals  Smoking Status Former     No data recorded    Physical Exam    Airway  Mallampati: II  TM distance: >3 FB  Neck ROM: full   "   Cardiovascular - normal exam     Dental    Pulmonary - normal exam     Abdominal - normal exam             Anesthesia Plan    History of general anesthesia?: no  History of complications of general anesthesia?: no    ASA 2     general   (THC use)  The patient is not a current smoker.    intravenous induction   Postoperative administration of opioids is intended.  Anesthetic plan and risks discussed with patient.  Use of blood products discussed with patient who.    Plan discussed with CRNA.

## 2024-08-22 NOTE — ANESTHESIA PROCEDURE NOTES
Airway  Date/Time: 8/22/2024 7:49 AM  Urgency: elective    Airway not difficult    Staffing  Performed: VIOLA   Authorized by: Macario Leung MD    Performed by: Katina Spears  Patient location during procedure: OR    Indications and Patient Condition  Indications for airway management: anesthesia  Spontaneous Ventilation: absent  Sedation level: deep  Preoxygenated: yes  Patient position: sniffing  MILS maintained throughout  Mask difficulty assessment: 1 - vent by mask  Planned trial extubation    Final Airway Details  Final airway type: endotracheal airway      Successful airway: ETT  Cuffed: yes   Successful intubation technique: direct laryngoscopy  Facilitating devices/methods: intubating stylet and cricoid pressure  Endotracheal tube insertion site: oral  Blade: Antonio  Blade size: #4  ETT size (mm): 7.5  Cormack-Lehane Classification: grade I - full view of glottis  Placement verified by: capnometry   Measured from: lips  ETT to lips (cm): 22  Number of attempts at approach: 1  Number of other approaches attempted: 0

## 2024-08-22 NOTE — POST-PROCEDURE NOTE
1100 Pt in phase II    1110 Family at bedside / pt get dress with family    1120 Discharge instruction reviewed with pt and family by nurse    1123 IV removed

## 2024-08-22 NOTE — ANESTHESIA POSTPROCEDURE EVALUATION
Patient: Que Cha    Procedure Summary       Date: 08/22/24 Room / Location: [facility] OR 08 / Virtual [facility] OR    Anesthesia Start: 0733 Anesthesia Stop: 0948    Procedure: Robotic Ventral Hernia Repair; Mesh Placement; converted to open Diagnosis:       Incisional hernia, without obstruction or gangrene      (Incisional hernia, without obstruction or gangrene [K43.2])    Surgeons: Juan Antonio Stokes MD Responsible Provider: Macario Leung MD    Anesthesia Type: general ASA Status: 2            Anesthesia Type: general    Vitals Value Taken Time   /77 08/22/24 1046   Temp 36.3 °C (97.3 °F) 08/22/24 0944   Pulse 53 08/22/24 1058   Resp 14 08/22/24 1045   SpO2 97 % 08/22/24 1058   Vitals shown include unfiled device data.    Anesthesia Post Evaluation    Patient location during evaluation: PACU  Patient participation: complete - patient participated  Level of consciousness: awake  Pain management: adequate  Airway patency: patent  Cardiovascular status: acceptable  Respiratory status: acceptable  Hydration status: acceptable  Postoperative Nausea and Vomiting: none        There were no known notable events for this encounter.

## 2024-08-22 NOTE — H&P
History Of Present Illness  Patient is a 29-year-old gentleman with complex past surgical history.  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  It appears that he underwent laparotomy with pancreatectomy and placement of a drain.  He states that he was hospitalized for roughly 1 month and subsequent rehab for 4 months following this procedure. Presented to clinic for evaluation and treatment of a fairly asymptomatic incisional hernia.  He is getting back to his baseline health and after going to a gym he has noted a bulge involving his abdominal wall.  No changes in health since clinic appointment on 5/22. Had interval CT scan showing ventral hernia.         Past Medical History  Past Medical History:   Diagnosis Date    Abdominal infection (Multi) 03/25/2020    Last Assessment & Plan:    Formatting of this note might be different from the original.      Assessment: Intraoperative cultures growing bacteroides species on aerobic cultures and GNB. Blood cultures positive for Bacteroides species and Clostridium perfringens. Repeat Blood cultures on 3/22 showing NGTD. Blood cultures 3/16 +Clostridium perfringens. PICC placed 4/1.    PLAN:    -Follow up repeat    Acute blood loss anemia 03/16/2020    Last Assessment & Plan:    Formatting of this note might be different from the original.      Assessment: EBL 200ml from OR 3/18 requiring  6U FFP   PLAN:    -Trend CBC   -Transfuse for Hgb <7    Acute necrotizing pancreatitis ([facility]) 03/16/2020    Last Assessment & Plan:    Formatting of this note might be different from the original.   Assessment: s/p exp lap with washout and I&D. Patient no longer febrile at night, WBC stable   PLAN:    - Bacteroides from intra-abdominal cultures   - Continue Zosyn per ID    Asthma ([facility])     Left leg swelling 03/16/2020    Last Assessment & Plan:    Formatting of this note might be different from the  "original.      Assessment: Worse edema on left side versus right.   PLAN:    -- DVT scan today    Moderate protein-calorie malnutrition (Multi) 03/26/2020    Last Assessment & Plan:    Formatting of this note might be different from the original.      Assessment: Corpak incidentally removed 3/25   PLAN:    -Continue GI soft diet    -Appreciate nutrition recs    Obese 09/07/2019    Severe protein-calorie malnutrition (Multi) 04/10/2020    Last Assessment & Plan:    Formatting of this note might be different from the original.   Assessment: On enteral nutrition. Minimal PO intake    PLAN:    -TF to goal   -GI soft diet    -Creon 36 with meals and snacks    -Appreciate nutrition recs       Surgical History  Past Surgical History:   Procedure Laterality Date    EXPLORATORY LAPAROTOMY      US GUIDED ABSCESS DRAIN  05/23/2022    US GUIDED ABSCESS DRAIN 5/23/2022 [facility] CLINICAL LEGACY        Social History  He reports that he has quit smoking. His smoking use included cigarettes. He has never used smokeless tobacco. He reports that he does not currently use alcohol. He reports current drug use. Drug: Marijuana.    Family History  No family history on file.     Allergies  Mold    Review of Systems   denies any weight loss, fever, change in bowel habits, melena, hematochezia, coronary artery disease, diabetes, hypertension, TIA/CVA, bleeding, jaundice, , hepatitis.   Physical Exam   Well-nourished, well-developed. In no distress  Alert and oriented x 3  Lungs are clear to auscultation bilaterally.  Cardiac exam is regular rhythm and rate.  Abdomen is soft nontender nondistended.  Large midline incision from xyphoid to umbilicus.    Obvious hernia in the epigastrium.  Smaller hernias around the umbilicus.  All reduce easily  Neurologic exam is without focal deficit.       Last Recorded Vitals  Blood pressure 118/64, pulse 54, temperature 36.1 °C (97 °F), resp. rate 16, height 1.803 m (5' 11\"), weight 80.4 kg (177 lb 4 oz), " SpO2 94%.  CT A/P 5/22     1. A small fat containing midline ventral wall hernia as described  above. No evidence of inflammation within the hernia sac.  2. Findings of small airway disease in the lung bases.  3. Sequela of prior granulomatous infection.  4. Chronic pancreatitis.         Assessment/Plan   Assessment & Plan  Incisional hernia, without obstruction or gangrene      Pt here today for robotic incisional hernia repair with mesh with Dr. Kike Denis MD

## 2024-08-22 NOTE — OP NOTE
Robotic Ventral Hernia Repair; Mesh Placement; converted to open Operative Note     Date: 2024  OR Location: [facility] OR    Name: Que Cha : 1995, Age: 29 y.o., MRN: [id number], Sex: male    Diagnosis  Pre-op Diagnosis      * Incisional hernia, without obstruction or gangrene [K43.2] Post-op Diagnosis     * Incisional hernia, without obstruction or gangrene [K43.2]     Procedures    98811 - SC RPR AA HERNIA 1ST 3-10 CM REDUCIBLE      Surgeons      * Juan Antonio Stokes - Primary    Resident/Fellow/Other Assistant:  Surgeons and Role:  * No surgeons found with a matching role *    Procedure Summary  Anesthesia: General  ASA: II  Anesthesia Staff: Anesthesiologist: Macario Leung MD  CRNA: KAILEE Espinoza-CRNA; KAILEE Rios-DAINA  SRNA: Katina Spears  Estimated Blood Loss: 5 mL  Intra-op Medications:   Administrations occurring from 0730 to 1030 on 24:   Medication Name Total Dose   sterile water irrigation solution 1,000 mL              Anesthesia Record               Intraprocedure I/O Totals          Intake    LR bolus 700.00 mL    Total Intake 700 mL       Output    Urine 500 mL    Total Output 500 mL       Net    Net Volume 200 mL          Specimen:   ID Type Source Tests Collected by Time   1 : HERNIA SAC Tissue HERNIA SAC SURGICAL PATHOLOGY EXAM Juan Antonio Stokes MD 2024 0834        Staff:   Circulator: Palak Carter Person: Alee  Circulator: Familia Newub Person: Anh Newub Person: Vtior Tiwari Scrub: Margret Tiwari Circulator: Sybil         Drains and/or Catheters:   Urethral Catheter Non-latex 16 Fr. (Active)       Findings: dense intraabdominal adhesions.  4 cm hernia defect    Indications: Que Cha is an 29 y.o. male who is having surgery for Incisional hernia, without obstruction or gangrene [K43.2].     The patient was seen in the preoperative area. The risks, benefits, complications, treatment options, non-operative alternatives, expected recovery and  outcomes were discussed with the patient. The possibilities of reaction to medication, pulmonary aspiration, injury to surrounding structures, bleeding, recurrent infection, the need for additional procedures, failure to diagnose a condition, and creating a complication requiring transfusion or operation were discussed with the patient. The patient concurred with the proposed plan, giving informed consent.  The site of surgery was properly noted/marked if necessary per policy. The patient has been actively warmed in preoperative area. Preoperative antibiotics have been ordered and given within 1 hours of incision. Venous thrombosis prophylaxis have been ordered including bilateral sequential compression devices     Procedure Details: Patient is a 29-year-old gentleman with complex past surgical history come in for elective repair of an incisional hernia.      he developed necrotizing   pancreatitis and was treated back in 2020 at the [facility].  It appears that he underwent laparotomy with pancreatectomy and placement of a drain.  He states that he was hospitalized for roughly 1 month and subsequent rehab for 4 months following this procedure.  He is getting back to his baseline health and after going to a gym he has noted a bulge involving his epigastric abdominal wall.   CT and physical exam confirms a hernia defect.  He comes in for elective repair.  We discussed the procedure to include risks, benefits and alternatives.  Patient agrees to the operation        Description of procedure:  Patient taken operating room, placed supine on the operating table.  Timeout was established, general anesthesia was induced.  He did receive antibiotics.  The abdomen was prepped and draped in a sterile fashion.    We began by placing a 2 mm trocar in the left upper abdominal wall at Edgar's point.  All we placed this port without difficulty.  We established insufflation.  Survey of the abdomen revealed dense adhesions of  omentum and loops of small bowel adherent to the anterior abdominal wall.  Our intent was to repair his hernia robotically.  Noting the dense adhesions, It was felt that if we continued via robotic approach the risk for bowel injury, fistula formation or other complications would outweigh any benefit therefore we elected to proceed with an open approach.    We remove the 12 mm port and this fascial defect was closed using 0 Vicryl suture in a figure-of-eight stitch pattern.  The skin was ultimately reapproximate using 3-0 subcuticular Vicryl stitches followed by Dermabond glue.    The fascial defect of concern was noted in the epigastrium.  We made a midline incision over this area and carried our dissection down to to the hernia sac.  The hernia sac was excised and handed off the field as a specimen.  The defect measured 4 cm and we elected to repair this using a 6.4 cm round Ventralex mesh.  This was placed as an  underlay and secured on 4 quadrants using 0-Prolene suture placed in an interrupted horizontal mattress fashion.  The knots were tied  anterior to the abdominal wall fascia.  This brought the mesh to lie  nicely as an underlay.  We then reapproximated the native fascia over the repair using 0-PDS suture. .  Skin was then closed using 3-0 and 4-0  subcuticular Vicryl stitches followed by Dermabond glue.   The patient tolerated the procedure without difficulty and was returned to the recovery room in stable conditions.         Complications:  None; patient tolerated the procedure well.    Disposition: PACU - hemodynamically stable.  Condition: stable       Attending Attestation: I was present and scrubbed for the entire procedure.    Juan Antonio Stokes  Phone Number: [phone]

## 2024-08-28 LAB
LABORATORY COMMENT REPORT: NORMAL
PATH REPORT.FINAL DX SPEC: NORMAL
PATH REPORT.GROSS SPEC: NORMAL
PATH REPORT.RELEVANT HX SPEC: NORMAL
PATH REPORT.TOTAL CANCER: NORMAL

## 2024-09-04 ENCOUNTER — OFFICE VISIT (OUTPATIENT)
Dept: SURGERY | Facility: CLINIC | Age: 29
End: 2024-09-04
Payer: COMMERCIAL

## 2024-09-04 VITALS
DIASTOLIC BLOOD PRESSURE: 56 MMHG | HEIGHT: 71 IN | HEART RATE: 63 BPM | WEIGHT: 176 LBS | SYSTOLIC BLOOD PRESSURE: 107 MMHG | BODY MASS INDEX: 24.64 KG/M2

## 2024-09-04 DIAGNOSIS — K43.2 INCISIONAL HERNIA, WITHOUT OBSTRUCTION OR GANGRENE: Primary | ICD-10-CM

## 2024-09-04 PROCEDURE — 99212 OFFICE O/P EST SF 10 MIN: CPT | Performed by: SURGERY

## 2024-09-04 PROCEDURE — 1036F TOBACCO NON-USER: CPT | Performed by: SURGERY

## 2024-09-04 PROCEDURE — 3008F BODY MASS INDEX DOCD: CPT | Performed by: SURGERY

## 2024-09-04 ASSESSMENT — PAIN SCALES - GENERAL: PAINLEVEL: 0-NO PAIN

## 2024-09-04 ASSESSMENT — ENCOUNTER SYMPTOMS: DEPRESSION: 0

## 2024-09-04 NOTE — PROGRESS NOTES
Mr. Que Cha is a 29 y.o. year old male who comes in today for follow-up after undergoing incisional hernia repair with mesh.    This procedure was performed on  8/22/24    Patient is doing very well and is pleased with the outcome of the surgery.    Tolerating a regular diet, bowel movements are normal    On exam patient looks well    Incisions are healing very nicely    Surgical Pathology Exam: [id number]  Order: [id number]   Collected 8/22/2024 08:34       Status: Final result       Visible to patient: Yes (not seen)       Dx: Incisional hernia, without obstructio...    0 Result Notes       Component  Resulting Agency   FINAL DIAGNOSIS   A. HERNIA REPAIR:  -- HERNIA SAC.             Impression: Doing well following  incisional hernia repair with mesh    Discussed increasing activity level    Follow-up with me as needed

## 2024-09-04 NOTE — LETTER
September 4, 2024     Berta Fenton PA-C  [address] [facility], [address]  [place] [facility] [place] [address]    Patient: Que Cha   YOB: 1995   Date of Visit: 9/4/2024       Dear Dr. Berta Fenton PA-C:    Thank you for referring Que Cha to me for evaluation. Below are my notes for this consultation.  If you have questions, please do not hesitate to call me. I look forward to following your patient along with you.       Sincerely,     Juan Antonio Stokes MD      CC: No Recipients  ______________________________________________________________________________________       Mr. Que Cha is a 29 y.o. year old male who comes in today for follow-up after undergoing incisional hernia repair with mesh.    This procedure was performed on  8/22/24    Patient is doing very well and is pleased with the outcome of the surgery.    Tolerating a regular diet, bowel movements are normal    On exam patient looks well    Incisions are healing very nicely    Surgical Pathology Exam: [id number]  Order: [id number]   Collected 8/22/2024 08:34       Status: Final result       Visible to patient: Yes (not seen)       Dx: Incisional hernia, without obstructio...    0 Result Notes       Component  Resulting Agency   FINAL DIAGNOSIS   A. HERNIA REPAIR:  -- HERNIA SAC.             Impression: Doing well following  incisional hernia repair with mesh    Discussed increasing activity level    Follow-up with me as needed

## 2024-09-17 ENCOUNTER — OFFICE VISIT (OUTPATIENT)
Dept: UROLOGY | Facility: HOSPITAL | Age: 29
End: 2024-09-17
Payer: COMMERCIAL

## 2024-09-17 DIAGNOSIS — N43.3 LEFT HYDROCELE: Primary | ICD-10-CM

## 2024-09-17 PROCEDURE — 99214 OFFICE O/P EST MOD 30 MIN: CPT | Performed by: STUDENT IN AN ORGANIZED HEALTH CARE EDUCATION/TRAINING PROGRAM

## 2024-09-17 PROCEDURE — 99204 OFFICE O/P NEW MOD 45 MIN: CPT | Performed by: STUDENT IN AN ORGANIZED HEALTH CARE EDUCATION/TRAINING PROGRAM

## 2024-09-17 NOTE — PROGRESS NOTES
Subjective   Patient ID: Que Cha is a 29 y.o. male    HPI  29 y.o. male who presents with concerns about a hydrocele on the left side. He reports that his left testicle appears abnormally larger than the right, a concern initially raised by his girlfriend. He consulted his MCP, Dr. Fenton, who recommended a scrotal ultrasound. The ultrasound revealed an inflammation of the cord and a hydrocele on the left side. Que denies any pain, blood in the urine, or burning sensation during urination. He also reports occasional issues with erection and ejaculation, which he attributes to mental overthinking rather than a physical problem. He acknowledges a tendency to overthink in general and is considering seeing a counselor for this issue.        The most recent Scrotal US, conducted on 08/06/2024, revealed:  Asymmetric thickening and increased vascularity by color Doppler  imaging involving left spermatic cord, correlate for funiculitis.  Large left hydrocele.      Small bilateral epididymal head cysts.           Review of Systems    All systems were reviewed. Anything negative was noted in the HPI.    Objective   Physical Exam    General: Well developed, well nourished, alert and cooperative, appears in no acute distress   Eyes: Non-injected conjunctiva, sclera clear, no proptosis   Cardiac: Extremities are warm and well perfused. No edema, cyanosis or pallor   Lungs: Breathing is easy, non-labored. Speaking in clear and complete sentences. Normal diaphragmatic movement   MSK: Ambulatory with steady gait, unassisted   Neuro: Alert and oriented to person, place, and time   Psych: Demonstrates good judgment and reason, without hallucinations, abnormal affect or abnormal behaviors   Skin: No obvious lesions, no rashes       No CVA tenderness bilaterally   No suprapubic pain or discomfort   Left hydrocele   Ashely testicular exam    Past Medical History:   Diagnosis Date    Abdominal infection (Multi) 03/25/2020     Last Assessment & Plan:    Formatting of this note might be different from the original.      Assessment: Intraoperative cultures growing bacteroides species on aerobic cultures and GNB. Blood cultures positive for Bacteroides species and Clostridium perfringens. Repeat Blood cultures on 3/22 showing NGTD. Blood cultures 3/16 +Clostridium perfringens. PICC placed 4/1.    PLAN:    -Follow up repeat    Acute blood loss anemia 03/16/2020    Last Assessment & Plan:    Formatting of this note might be different from the original.      Assessment: EBL 200ml from OR 3/18 requiring  6U FFP   PLAN:    -Trend CBC   -Transfuse for Hgb <7    Acute necrotizing pancreatitis ([facility]) 03/16/2020    Last Assessment & Plan:    Formatting of this note might be different from the original.   Assessment: s/p exp lap with washout and I&D. Patient no longer febrile at night, WBC stable   PLAN:    - Bacteroides from intra-abdominal cultures   - Continue Zosyn per ID    Asthma ([facility])     Left leg swelling 03/16/2020    Last Assessment & Plan:    Formatting of this note might be different from the original.      Assessment: Worse edema on left side versus right.   PLAN:    -- DVT scan today    Moderate protein-calorie malnutrition (Multi) 03/26/2020    Last Assessment & Plan:    Formatting of this note might be different from the original.      Assessment: Corpak incidentally removed 3/25   PLAN:    -Continue GI soft diet    -Appreciate nutrition recs    Obese 09/07/2019    Severe protein-calorie malnutrition (Multi) 04/10/2020    Last Assessment & Plan:    Formatting of this note might be different from the original.   Assessment: On enteral nutrition. Minimal PO intake    PLAN:    -TF to goal   -GI soft diet    -Creon 36 with meals and snacks    -Appreciate nutrition recs         Past Surgical History:   Procedure Laterality Date    EXPLORATORY LAPAROTOMY      US GUIDED ABSCESS DRAIN  05/23/2022    US GUIDED ABSCESS DRAIN 5/23/2022  [facility] CLINICAL LEGACY         Assessment/Plan   Left Hydrocele, very Mild Erectile dysfunction    29 y.o. male who presents for the above condition, We had a very long and extensive discussion with the patient regarding the pathophysiology, differential diagnosis, risk factor, management, natural history, incidence and diagnostic work-up of the condition.     We had a very long and extensive discussion with the patient regarding the pathophysiology, differential diagnosis, risk factor, management, natural history, incidence and diagnostic work-up of the condition.  We discussed at length option of management including hydrocelectomy.  Discussed the risk, benefits, potential complications adverse events.  He verbalized understanding and would like to proceed.We had a long and excess discussion with the patient regarding pathophysiology, differential diagnosis, risk factor, sick condition and management of hydrocele. Explained to him that the only permanent solution for hydrocele is to proceed with a surgical hydrocelectomy. I discussed the step-by-step details of hydrocelectomy. We also went at length into the discussion of the risk, benefit, potential complication, adverse events including but not limited to infection, bleeding, pain, edema and swelling, injury to the surrounding structures. Patient was understanding like to proceed with observation.         Plan:  - Follow up in 1 year with Scrotal US        9/17/2024    Wanda Attestation  By signing my name below, I, Wanda Soto   attest that this documentation has been prepared under the direction and in the presence of Dr. Collin Caraballo

## 2024-11-04 DIAGNOSIS — J45.20 MILD INTERMITTENT ASTHMA WITHOUT COMPLICATION (HHS-HCC): ICD-10-CM

## 2024-11-05 RX ORDER — ALBUTEROL SULFATE 90 UG/1
1-2 AEROSOL, METERED RESPIRATORY (INHALATION) EVERY 6 HOURS PRN
Qty: 8 G | Refills: 2 | Status: SHIPPED | OUTPATIENT
Start: 2024-11-05

## 2025-01-23 DIAGNOSIS — J45.20 MILD INTERMITTENT ASTHMA WITHOUT COMPLICATION (HHS-HCC): ICD-10-CM

## 2025-01-23 RX ORDER — ALBUTEROL SULFATE 90 UG/1
1-2 INHALANT RESPIRATORY (INHALATION) EVERY 6 HOURS PRN
Qty: 18 G | Refills: 2 | Status: SHIPPED | OUTPATIENT
Start: 2025-01-23

## 2025-01-27 DIAGNOSIS — J45.20 MILD INTERMITTENT ASTHMA WITHOUT COMPLICATION (HHS-HCC): ICD-10-CM

## 2025-01-27 RX ORDER — ALBUTEROL SULFATE 90 UG/1
2 INHALANT RESPIRATORY (INHALATION) EVERY 6 HOURS PRN
Qty: 18 G | Refills: 0 | Status: SHIPPED | OUTPATIENT
Start: 2025-01-27 | End: 2026-01-27

## 2025-02-18 NOTE — PROGRESS NOTES
Subjective   Patient ID: Que Cha is a 29 y.o. male    HPI  29 y.o. male who presented for vasectomy evaluation, the patient is  with 0 children. I had a long and extensive discussion with the patient regarding vasectomy. I informed him that he should consider this procedure as permanent and he should be 100% sure about proceeding with it. I explained to him in detail the steps of the vasectomy, I also had a long discussion with him regarding the possible side effects including infection, hematoma, bleeding, chronic pain, testicular congestion, injury to surrounding structure. We also discussed the multiple studies linking vasectomy to prostate cancer I explained to him the correlation between this procedure and prostate cancer. The patient verbalized understanding of all the risks and benefits and would like to proceed. I explained to him that there is a small chance of spontaneous return of the semen because of reconnection of his vas ending. I also told him that he is not close to the sterile unless it is proven by a post vasectomy semen analysis after around 3 months. I also explained to him that some man with a much longer to clear his semen from there, this most could take up to 6 months during which he would be still able to proceed.    Left hydrocele noted during physical exam today.      The most recent Scrotal US, conducted on 08/06/2024, revealed:  Asymmetric thickening and increased vascularity by color Doppler  imaging involving left spermatic cord, correlate for funiculitis.  Large left hydrocele.      Small bilateral epididymal head cysts.      Review of Systems    All systems were reviewed. Anything negative was noted in the HPI.    Objective   Physical Exam  Genitourinary:     Pubic Area: No rash.       Penis: Normal and circumcised. No hypospadias.       Testes:         Right: Mass, tenderness, swelling, testicular hydrocele or varicocele not present. Right testis is descended.          Left: Mass, tenderness, swelling, testicular hydrocele or varicocele not present. Left testis is descended.      Epididymis:      Right: Not inflamed or enlarged. No mass or tenderness.      Left: Not inflamed or enlarged. No mass or tenderness.     Large left hydrocele     General: Well developed, well nourished, alert and cooperative, appears in no acute distress   Eyes: Non-injected conjunctiva, sclera clear, no proptosis   Cardiac: Extremities are warm and well perfused. No edema, cyanosis or pallor   Lungs: Breathing is easy, non-labored. Speaking in clear and complete sentences. Normal diaphragmatic movement   MSK: Ambulatory with steady gait, unassisted   Neuro: Alert and oriented to person, place, and time   Psych: Demonstrates good judgment and reason, without hallucinations, abnormal affect or abnormal behaviors   Skin: No obvious lesions, no rashes       No CVA tenderness bilaterally   No suprapubic pain or discomfort       Past Medical History:   Diagnosis Date    Abdominal infection (Multi) 03/25/2020    Last Assessment & Plan:    Formatting of this note might be different from the original.      Assessment: Intraoperative cultures growing bacteroides species on aerobic cultures and GNB. Blood cultures positive for Bacteroides species and Clostridium perfringens. Repeat Blood cultures on 3/22 showing NGTD. Blood cultures 3/16 +Clostridium perfringens. PICC placed 4/1.    PLAN:    -Follow up repeat    Acute blood loss anemia 03/16/2020    Last Assessment & Plan:    Formatting of this note might be different from the original.      Assessment: EBL 200ml from OR 3/18 requiring  6U FFP   PLAN:    -Trend CBC   -Transfuse for Hgb <7    Acute necrotizing pancreatitis (HHS-HCC) 03/16/2020    Last Assessment & Plan:    Formatting of this note might be different from the original.   Assessment: s/p exp lap with washout and I&D. Patient no longer febrile at night, WBC stable   PLAN:    - Bacteroides from  intra-abdominal cultures   - Continue Zosyn per ID    Asthma (HHS-HCC)     Left leg swelling 03/16/2020    Last Assessment & Plan:    Formatting of this note might be different from the original.      Assessment: Worse edema on left side versus right.   PLAN:    -- DVT scan today    Moderate protein-calorie malnutrition (Multi) 03/26/2020    Last Assessment & Plan:    Formatting of this note might be different from the original.      Assessment: Corpak incidentally removed 3/25   PLAN:    -Continue GI soft diet    -Appreciate nutrition recs    Obese 09/07/2019    Severe protein-calorie malnutrition (Multi) 04/10/2020    Last Assessment & Plan:    Formatting of this note might be different from the original.   Assessment: On enteral nutrition. Minimal PO intake    PLAN:    -TF to goal   -GI soft diet    -Creon 36 with meals and snacks    -Appreciate nutrition recs         Past Surgical History:   Procedure Laterality Date    EXPLORATORY LAPAROTOMY      US GUIDED ABSCESS DRAIN  05/23/2022    US GUIDED ABSCESS DRAIN 5/23/2022 [facility] CLINICAL LEGACY           Assessment/Plan   Vasectomy Evaluation, large left hydrocele     29 y.o. male who presents for the above condition, Vasectomy Evaluation     Patient presents today for evaluation of vasectomy. We had an extensive discussion about the procedure and post-procedure protocol. We discussed that vasectomy is intended to be a permanent form of contraception. There are options for fertility post vasectomy, including vasectomy reversal and sperm retrieval but these are not always successful and can be rather expensive.  We highlighted that it is not an immediate form of contraception, and that another form is required until vas occlusion is confirmed with a post-vasectomy semen analysis. We recommend that the patient refrain from ejaculation for 1 week post-procedure and we would be checking a post-vasectomy semen analysis in 2-3 months, or 15-20 ejaculates. Need for repeat  vasectomy is very low, <1%. There is a very small risk for pregnancy after vasectomy (1 in 2,000). We define a successful vasectomy by achieving azoospermia or less than 100,000 non-motile sperm on post-vasectomy semen analysis.  We discussed that the procedure would be done in the office under local anesthesia. Complications were discussed including but not limited to pain, which can be chronic in nature, bleeding/hematoma formation, and infection. We recommended ice and rest for the next 48-72hrs. Patient may be prescribed an anti-inflammatory for pain control. Patient is instructed to refrain from strenuous activity for 2 weeks.  No barriers to learning were identified. After all of the patient’s questions were satisfactorily answered, he expressed understanding of the risks of surgery and wishes to proceed with vasectomy.     We had a very long and extensive discussion with the patient regarding the pathophysiology, differential diagnosis, risk factor, management, natural history, incidence and diagnostic work-up of the condition.  We discussed at length option of management including hydrocelectomy.  Discussed the risk, benefits, potential complications adverse events.  He verbalized understanding and would like to proceed.We had a long and excess discussion with the patient regarding pathophysiology, differential diagnosis, risk factor, sick condition and management of hydrocele. Explained to him that the only permanent solution for hydrocele is to proceed with a surgical hydrocelectomy. I discussed the step-by-step details of hydrocelectomy. We also went at length into the discussion of the risk, benefit, potential complication, adverse events including but not limited to infection, bleeding, pain, edema and swelling, injury to the surrounding structures. Patient was understanding like to proceed with hydrocelectomy.       Plan:  - Schedule patient for Vasectomy and left Hydrocelectomy      E&M visit today is  associated with current or anticipated ongoing medical care services related to a patient's single, serious condition or a complex condition.     2/19/2025    Scribe Attestation  By signing my name below, I, Wanda Aguilar attest that this documentation has been prepared under the direction and in the presence of Dr. Collin Caraballo.

## 2025-02-19 ENCOUNTER — OFFICE VISIT (OUTPATIENT)
Dept: UROLOGY | Facility: HOSPITAL | Age: 30
End: 2025-02-19
Payer: COMMERCIAL

## 2025-02-19 DIAGNOSIS — N43.3 LEFT HYDROCELE: Primary | ICD-10-CM

## 2025-02-19 PROCEDURE — 99214 OFFICE O/P EST MOD 30 MIN: CPT | Performed by: STUDENT IN AN ORGANIZED HEALTH CARE EDUCATION/TRAINING PROGRAM

## 2025-02-21 ENCOUNTER — PREP FOR PROCEDURE (OUTPATIENT)
Dept: UROLOGY | Facility: HOSPITAL | Age: 30
End: 2025-02-21
Payer: COMMERCIAL

## 2025-02-21 DIAGNOSIS — N43.3 HYDROCELE IN ADULT: Primary | ICD-10-CM

## 2025-02-21 DIAGNOSIS — Z30.2 ADMISSION FOR VASECTOMY: ICD-10-CM

## 2025-02-21 RX ORDER — CEFAZOLIN SODIUM 2 G/100ML
2 INJECTION, SOLUTION INTRAVENOUS ONCE
OUTPATIENT
Start: 2025-02-21 | End: 2025-02-21

## 2025-03-07 DIAGNOSIS — J45.20 MILD INTERMITTENT ASTHMA WITHOUT COMPLICATION (HHS-HCC): ICD-10-CM

## 2025-04-01 ENCOUNTER — TELEPHONE (OUTPATIENT)
Dept: PREADMISSION TESTING | Facility: HOSPITAL | Age: 30
End: 2025-04-01
Payer: COMMERCIAL

## 2025-04-04 ENCOUNTER — PRE-ADMISSION TESTING (OUTPATIENT)
Dept: PREADMISSION TESTING | Facility: HOSPITAL | Age: 30
End: 2025-04-04
Payer: COMMERCIAL

## 2025-04-04 ENCOUNTER — LAB (OUTPATIENT)
Dept: LAB | Facility: HOSPITAL | Age: 30
End: 2025-04-04
Payer: COMMERCIAL

## 2025-04-04 VITALS
RESPIRATION RATE: 16 BRPM | HEART RATE: 68 BPM | TEMPERATURE: 97.3 F | HEIGHT: 71 IN | WEIGHT: 196.21 LBS | SYSTOLIC BLOOD PRESSURE: 131 MMHG | DIASTOLIC BLOOD PRESSURE: 75 MMHG | OXYGEN SATURATION: 97 % | BODY MASS INDEX: 27.47 KG/M2

## 2025-04-04 DIAGNOSIS — Z01.818 ENCOUNTER FOR OTHER PREPROCEDURAL EXAMINATION: Primary | ICD-10-CM

## 2025-04-04 DIAGNOSIS — Z01.818 PREOP TESTING: Primary | ICD-10-CM

## 2025-04-04 LAB
ANION GAP SERPL CALC-SCNC: 13 MMOL/L (ref 10–20)
BASOPHILS # BLD AUTO: 0.07 X10*3/UL (ref 0–0.1)
BASOPHILS NFR BLD AUTO: 1 %
BUN SERPL-MCNC: 20 MG/DL (ref 6–23)
CALCIUM SERPL-MCNC: 9.5 MG/DL (ref 8.6–10.3)
CHLORIDE SERPL-SCNC: 104 MMOL/L (ref 98–107)
CO2 SERPL-SCNC: 27 MMOL/L (ref 21–32)
CREAT SERPL-MCNC: 0.89 MG/DL (ref 0.5–1.3)
EGFRCR SERPLBLD CKD-EPI 2021: >90 ML/MIN/1.73M*2
EOSINOPHIL # BLD AUTO: 0.9 X10*3/UL (ref 0–0.7)
EOSINOPHIL NFR BLD AUTO: 12.6 %
ERYTHROCYTE [DISTWIDTH] IN BLOOD BY AUTOMATED COUNT: 15.1 % (ref 11.5–14.5)
GLUCOSE SERPL-MCNC: 92 MG/DL (ref 74–99)
HCT VFR BLD AUTO: 43.9 % (ref 41–52)
HGB BLD-MCNC: 13.7 G/DL (ref 13.5–17.5)
IMM GRANULOCYTES # BLD AUTO: 0.02 X10*3/UL (ref 0–0.7)
IMM GRANULOCYTES NFR BLD AUTO: 0.3 % (ref 0–0.9)
LYMPHOCYTES # BLD AUTO: 1.77 X10*3/UL (ref 1.2–4.8)
LYMPHOCYTES NFR BLD AUTO: 24.9 %
MCH RBC QN AUTO: 27 PG (ref 26–34)
MCHC RBC AUTO-ENTMCNC: 31.2 G/DL (ref 32–36)
MCV RBC AUTO: 86 FL (ref 80–100)
MONOCYTES # BLD AUTO: 0.37 X10*3/UL (ref 0.1–1)
MONOCYTES NFR BLD AUTO: 5.2 %
NEUTROPHILS # BLD AUTO: 3.99 X10*3/UL (ref 1.2–7.7)
NEUTROPHILS NFR BLD AUTO: 56 %
NRBC BLD-RTO: 0 /100 WBCS (ref 0–0)
PLATELET # BLD AUTO: 232 X10*3/UL (ref 150–450)
POTASSIUM SERPL-SCNC: 4 MMOL/L (ref 3.5–5.3)
RBC # BLD AUTO: 5.08 X10*6/UL (ref 4.5–5.9)
SODIUM SERPL-SCNC: 140 MMOL/L (ref 136–145)
WBC # BLD AUTO: 7.1 X10*3/UL (ref 4.4–11.3)

## 2025-04-04 PROCEDURE — 80048 BASIC METABOLIC PNL TOTAL CA: CPT

## 2025-04-04 PROCEDURE — 85025 COMPLETE CBC W/AUTO DIFF WBC: CPT

## 2025-04-04 RX ORDER — ACETAMINOPHEN, DIPHENHYDRAMINE HCL, PHENYLEPHRINE HCL 325; 25; 5 MG/1; MG/1; MG/1
TABLET ORAL DAILY
COMMUNITY

## 2025-04-04 RX ORDER — ESCITALOPRAM OXALATE 10 MG/1
10 TABLET ORAL DAILY
COMMUNITY

## 2025-04-04 RX ORDER — HYDROXYZINE HYDROCHLORIDE 25 MG/1
TABLET, FILM COATED ORAL
COMMUNITY

## 2025-04-04 ASSESSMENT — ENCOUNTER SYMPTOMS
FEVER: 0
LIMITED RANGE OF MOTION: 0
EYE PAIN: 0
DIFFICULTY URINATING: 0
MYALGIAS: 0
SHORTNESS OF BREATH: 0
PALPITATIONS: 0
HEMOPTYSIS: 0
DYSPNEA AT REST: 0
NAUSEA: 0
CONFUSION: 0
COUGH: 0
CONSTIPATION: 0
BLOOD IN STOOL: 0
NECK STIFFNESS: 0
WOUND: 0
NECK PAIN: 0
SKIN CHANGES: 0
DYSURIA: 0
EYE DISCHARGE: 0
SINUS CONGESTION: 0
CHILLS: 0
RHINORRHEA: 0
ABDOMINAL DISTENTION: 0
LIGHT-HEADEDNESS: 0
UNEXPECTED WEIGHT CHANGE: 0
DIARRHEA: 0
TROUBLE SWALLOWING: 0
DOUBLE VISION: 0
NUMBNESS: 0
WHEEZING: 0
DYSPNEA WITH EXERTION: 0
VOMITING: 0
VISUAL CHANGE: 0
ARTHRALGIAS: 0
BRUISES/BLEEDS EASILY: 0
EXCESSIVE BLEEDING: 0
WEAKNESS: 0
ABDOMINAL PAIN: 0

## 2025-04-04 NOTE — CPM/PAT H&P
[facility]/PAT Evaluation       Name: Que Cha (Que Cha)  /Age: 1995/30 y.o.       Date of Consult: 25    Referring Provider: Dr. Caraballo    Surgery, Date, and Length:     Left Hydrocelectomy - Left  Bilateral Vasectomy - Bilateral   25, 60MIN    Que Cha is a 30 y.o. year-old male who presents to the [facility] for perioperative risk assessment prior to surgery.    Patient presents with a primary diagnosis of left hydrocele and desire for sterilization. Pt refers to swelling in left hemiscrotum for at least the past 12 months.  No pain associated with the enlargement.  No pain in scrotum per patient. He does not have any children and does not wish to in the future.  Pt wishes to proceed as planned.      This note was created in part upon personal review of patient's medical records.      Patient is scheduled to have Left Hydrocelectomy - Left  Bilateral Vasectomy - Bilateral      Pt denies any past history of anesthetic complications such as PONV, awareness, prolonged sedation, dental damage, aspiration, cardiac arrest, difficult intubation, difficult I.V. access or unexpected hospital admissions.  NO malignant hyperthermia and or pseudocholinesterase deficiency.  +history of blood transfusions ; 2020 - at time of pancreatitis    The patient is not a [facility] and will accept blood and blood products if medically indicated.   Type and screen NOT sent.     Past Medical History:   Diagnosis Date    Abdominal infection (Multi)     MRSA fluid collection removed surgically    Acute pancreatic necrosis (HHS-HCC)     surgery at [facility], no alcohol use since     Anemia     H/H= 14.5/45.8 on 24    Asthma     Depression     Hydrocele, left        Past Surgical History:   Procedure Laterality Date    EXPLORATORY LAPAROTOMY      INCISIONAL HERNIA REPAIR  2024    PANCREAS SURGERY      Hx nec panc with multiple WOPN s/p ex lap, necrosectomy (3/16) followed by second look with  abdominal closure (3/18). Axiom drains placed on 3/27 for further drainage    US GUIDED ABSCESS DRAIN  2022    US GUIDED ABSCESS DRAIN 2022 [email or web address] CLINICAL LEGACY       Patient  has no history on file for sexual activity.    Family History   Problem Relation Name Age of Onset    Hypertension Mother      Hypertension Brother         Social History     Socioeconomic History    Marital status: Single     Spouse name: Not on file    Number of children: Not on file    Years of education: Not on file    Highest education level: Not on file   Occupational History    Not on file   Tobacco Use    Smoking status: Never    Smokeless tobacco: Never   Vaping Use    Vaping status: Not on file   Substance and Sexual Activity    Alcohol use: Not Currently     Comment: h/o abuse in     Drug use: Yes     Types: Marijuana    Sexual activity: Not on file   Other Topics Concern    Not on file   Social History Narrative    Not on file     Social Drivers of Health     Financial Resource Strain: Not on File (2021)    Received from [facility]    Financial Resource Strain     Financial Resource Strain: 0   Food Insecurity: Not on File (2024)    Received from [facility]    Food Insecurity     Food: 0   Transportation Needs: Not on File (2021)    Received from [facility]    Transportation Needs     Transportation: 0   Physical Activity: Not on File (2021)    Received from [facility]    Physical Activity     Physical Activity: 0   Stress: Not on File (2021)    Received from [facility]    Stress     Stress: 0   Social Connections: Not on File (2024)    Received from [facility]    Social Connections     Connectedness: 0   Intimate Partner Violence: Not on file   Housing Stability: Not on File (2021)    Received from [facility]    Housing Stability     Housin      Allergies   Allergen Reactions    Mold Unknown       Current Outpatient Medications   Medication Instructions    albuterol (Ventolin  HFA) 90 mcg/actuation inhaler 2 puffs, inhalation, Every 6 hours PRN    escitalopram (LEXAPRO) 10 mg, Daily    hydrOXYzine HCL (Atarax) 25 mg tablet Take by mouth.    melatonin 10 mg tablet Daily    oxyCODONE (ROXICODONE) 5 mg, oral, Every 6 hours PRN            PAT ROS:   Constitutional:    no fever   no chills   no unexpected weight change  Neuro/Psych:    no numbness   no weakness   no light-headedness   no confusion  Eyes:    no discharge   no pain   no vision loss   no diplopia   no visual disturbance  Ears:    no ear pain   no hearing loss   no tinnitus  Nose:    no nasal discharge   no sinus congestion   no epistaxis  Mouth:    no dental issues   no mouth pain   no oral bleeding   no mouth lesions  Throat:    no throat pain   no dysphagia  Neck:    no neck pain   no neck stiffness  Cardio:    Functional 4 Mets. Patient denies SOB walking up 2 flights of stairs   Gym in basement; cardio + weights = 2-3 days per week  Cooking, cleaning and grocery shopping   no chest pain   no palpitations   no peripheral edema   no dyspnea   no FENTON  Respiratory:    no cough   no wheezing   no hemoptysis   no shortness of breath  Endocrine:    no cold intolerance   no heat intolerance  GI:    no abdominal distention   no abdominal pain   no constipation   no diarrhea   no nausea   no vomiting   no blood in stool  :    no difficulty urinating   no dysuria   no oliguria  Musculoskeletal:    no arthralgias   no myalgias   no decreased ROM  Hematologic:    does not bruise/bleed easily   no excessive bleeding   no history of blood transfusion   no blood clots  Skin:   no skin changes   no sores/wound   no rash      Physical Exam  Constitutional:       General: He is not in acute distress.     Appearance: Normal appearance. He is not ill-appearing, toxic-appearing or diaphoretic.   HENT:      Head: Normocephalic and atraumatic.      Nose: Nose normal. No congestion or rhinorrhea.      Mouth/Throat:      Mouth: Mucous membranes are  "moist.      Pharynx: No posterior oropharyngeal erythema.   Eyes:      Extraocular Movements: Extraocular movements intact.      Conjunctiva/sclera: Conjunctivae normal.   Cardiovascular:      Rate and Rhythm: Normal rate and regular rhythm.      Pulses: Normal pulses.      Heart sounds: Normal heart sounds. No murmur heard.     No friction rub. No gallop.   Pulmonary:      Effort: Pulmonary effort is normal. No respiratory distress.      Breath sounds: Normal breath sounds. No stridor. No wheezing, rhonchi or rales.   Abdominal:      General: Bowel sounds are normal. There is no distension.      Palpations: Abdomen is soft. There is no mass.      Tenderness: There is no abdominal tenderness. There is no guarding or rebound.      Hernia: No hernia is present.   Genitourinary:     Comments: (Per patient ) left hemiscrotal swelling  Musculoskeletal:         General: No swelling, tenderness, deformity or signs of injury. Normal range of motion.      Cervical back: Normal range of motion and neck supple. No rigidity or tenderness.   Skin:     General: Skin is warm and dry.      Coloration: Skin is not jaundiced or pale.      Findings: No bruising, erythema, lesion or rash.   Neurological:      General: No focal deficit present.      Mental Status: He is alert and oriented to person, place, and time.      Cranial Nerves: No cranial nerve deficit.      Sensory: No sensory deficit.      Motor: No weakness.      Coordination: Coordination normal.   Psychiatric:         Mood and Affect: Mood normal.         Behavior: Behavior normal.          PAT AIRWAY:   Airway:     Mallampati::  II    Neck ROM::  Full   Few missing teeth; no loose teeth          Visit Vitals  /75   Pulse 68   Temp 36.3 °C (97.3 °F)   Resp 16   Ht 1.8 m (5' 10.87\")   Wt 89 kg (196 lb 3.4 oz)   SpO2 97%   BMI 27.47 kg/m²   Smoking Status Never   BSA 2.11 m²     LABS:  Lab Results   Component Value Date    WBC 7.1 04/04/2025    HGB 13.7 04/04/2025    " HCT 43.9 04/04/2025    MCV 86 04/04/2025     04/04/2025      Lab Results   Component Value Date    GLUCOSE 92 04/04/2025    CALCIUM 9.5 04/04/2025     04/04/2025    K 4.0 04/04/2025    CO2 27 04/04/2025     04/04/2025    BUN 20 04/04/2025    CREATININE 0.89 04/04/2025          Assessment and Plan:     30 y.o.  male  scheduled for Left Hydrocelectomy - Left  Bilateral Vasectomy - Bilateral on 4/18/25 with Dr. Caraballo for  left sided hydrocele and desire for sterilization.   Presents to [facility] today for perioperative risk stratification and optimization      Cardiovascular:  Patient has no active cardiac symptoms.   Patient denies any chest pain, tightness, heaviness, pressure, radiating pain, palpitations, irregular heartbeats, lightheadedness, cough, congestion, shortness of breath, FENTON, PND, near syncope, weight loss or gain.    METS: 9.9  RCRI: 0 points, 3.9%  risk for postoperative MACE     No cardiac issues    Pulmonary:  No pulmonary diagnosis or significant findings on chart review or clinical presentation.  No further preoperative testing is indicated at this time.    Stop Bang score is 1 placing patient at low risk for ARUNA  ARISCAT: <26 points, 1.6% risk of in-hospital postoperative pulmonary complication  PRODIGY: Moderate risk for opioid induced respiratory depression    **Pt provided with deep breathing exercises during PAT visit today**    Hematology:  Anemia  5/25/22 H/H 12.6/40.0%  8/6/24 H/H 14.5/45.8%    Patient instructed to ambulate as soon as possible postoperatively to decrease thromboembolic risk.   Initiate mechanical DVT prophylaxis as soon as possible and initiate chemical prophylaxis when deemed safe from a bleeding standpoint post surgery.     LABS: CBC and BMP ordered. Lab results reviewed and unremarkable.     Caprini: 3    Risk assessment complete.  Patient is scheduled for a intermediate surgical risk procedure.        Preoperative medication instructions were provided  and reviewed with the patient.  Any additional testing or evaluation was explained to the patient.  Nothing by mouth instructions were discussed and patient's questions were answered prior to conclusion to this encounter.  Patient verbalized understanding of preoperative instructions given in preadmission testing; discharge instructions available in EMR.    This note was dictated by a speech recognition.  Minor errors may have been detected in a speech recognition.

## 2025-04-04 NOTE — CPM/PAT NURSE NOTE
CPM/PAT Nurse Note      Name: Que Cha (Que Cha)  /Age: [birth year]//30 y.o.       Past Medical History:   Diagnosis Date    Abdominal infection (Multi)     MRSA fluid collection removed surgically    Acute pancreatic necrosis (HHS-HCC)     surgery at [facility], no alcohol use since     Anemia     H/H= 14.5/45.8 on 24    Asthma     Depression     Hydrocele, left        Past Surgical History:   Procedure Laterality Date    EXPLORATORY LAPAROTOMY      INCISIONAL HERNIA REPAIR  2024    PANCREAS SURGERY      Hx nec panc with multiple WOPN s/p ex lap, necrosectomy (3/16) followed by second look with abdominal closure (3/18). Axiom drains placed on 3/27 for further drainage    US GUIDED ABSCESS DRAIN  2022    US GUIDED ABSCESS DRAIN 2022 [facility] CLINICAL LEGACY       Patient  has no history on file for sexual activity.    No family history on file.    Allergies   Allergen Reactions    Mold Unknown       Prior to Admission medications    Medication Sig Start Date End Date Taking? Authorizing Provider   albuterol (Ventolin HFA) 90 mcg/actuation inhaler Inhale 2 puffs every 6 hours if needed for wheezing. 25 Yes Berta Fenton PA-C   escitalopram (Lexapro) 10 mg tablet Take 1 tablet (10 mg) by mouth once daily.   Yes Historical Provider, MD   hydrOXYzine HCL (Atarax) 25 mg tablet Take by mouth.   Yes Historical Provider, MD   melatonin 10 mg tablet Take by mouth once daily.   Yes Historical Provider, MD   albuterol 90 mcg/actuation inhaler INHALE 1-2 PUFFS EVERY 6 HOURS IF NEEDED FOR WHEEZING. 25   Berta Fenton PA-C   oxyCODONE (Roxicodone) 5 mg immediate release tablet Take 1 tablet (5 mg) by mouth every 6 hours if needed for severe pain (7 - 10) for up to 12 doses.  Patient not taking: Reported on 2025   MD RACHEAL Fair     DASI Risk Score      Flowsheet Row Questionnaire Series Submission from 2024 in [facility] with Generic  Provider Mychart   Can you take care of yourself (eat, dress, bathe, or use toilet)?  2.75  filed at 08/05/2024 1639   Can you walk indoors, such as around your house? 1.75  filed at 08/05/2024 1639   Can you walk a block or two on level ground?  2.75  filed at 08/05/2024 1639   Can you climb a flight of stairs or walk up a hill? 5.5  filed at 08/05/2024 1639   Can you run a short distance? 8  filed at 08/05/2024 1639   Can you do light work around the house like dusting or washing dishes? 2.7  filed at 08/05/2024 1639   Can you do moderate work around the house like vacuuming, sweeping floors or carrying groceries? 3.5  filed at 08/05/2024 1639   Can you do heavy work around the house like scrubbing floors or lifting and moving heavy furniture?  8  filed at 08/05/2024 1639   Can you do yard work like raking leaves, weeding or pushing a mower? 4.5  filed at 08/05/2024 1639   Can you have sexual relations? 5.25  filed at 08/05/2024 1639   Can you participate in moderate recreational activities like golf, bowling, dancing, doubles tennis or throwing a baseball or football? 6  filed at 08/05/2024 1639   Can you participate in strenous sports like swimming, singles tennis, football, basketball, or skiing? 7.5  filed at 08/05/2024 1639   DASI SCORE 58.2  filed at 08/05/2024 1639   METS Score (Will be calculated only when all the questions are answered) 9.9  filed at 08/05/2024 1639          Caprini DVT Assessment    No data to display       Modified Frailty Index    No data to display       [id number] Stroke Risk Points  Current as of a minute ago        N/A 0 to 9 Points:      Last Change: N/A          The [id number]-DIYk risk score (Lip DANII, et al. 2009. © 2010 [employer]) quantifies the risk of stroke for a patient with atrial fibrillation. For patients without atrial fibrillation or under the age of 18 this score appears as N/A. Higher score values generally indicate higher risk of stroke.         This score is not applicable to this patient. Components are not calculated.          Revised Cardiac Risk Index    No data to display       Apfel Simplified Score    No data to display       Risk Analysis Index Results This Encounter    No data found in the last 10 encounters.       Stop Bang Score      Flowsheet Row Questionnaire Series Submission from 8/5/2024 in [facility] with Generic Provider Julian   Do you snore loudly? 0  filed at 08/05/2024 1639   Do you often feel tired or fatigued after your sleep? 0  filed at 08/05/2024 1639   Has anyone ever observed you stop breathing in your sleep? 0  filed at 08/05/2024 1639   Do you have or are you being treated for high blood pressure? 0  filed at 08/05/2024 1639   Recent BMI (Calculated) 26.1  filed at 08/05/2024 1639   Is BMI greater than 35 kg/m2? 0=No  filed at 08/05/2024 1639   Age older than 50 years old? 0=No  filed at 08/05/2024 1639   Gender - Male 1=Yes  filed at 08/05/2024 1639          Prodigy: High Risk  Total Score: 11              Prodigy Gender Score     Prodigy Previous Opioid Use Score           ARISCAT Score for Postoperative Pulmonary Complications    No data to display       Swan Perioperative Risk for Myocardial Infarction or Cardiac Arrest (NURY)    No data to display         Nurse Plan of Action:     After Visit Summary (AVS) reviewed and patient verbalized good understanding of medications and NPO instructions.

## 2025-04-04 NOTE — PREPROCEDURE INSTRUCTIONS
Medication List            Accurate as of April 4, 2025 11:01 AM. Always use your most recent med list.                * albuterol 90 mcg/actuation inhaler  INHALE 1-2 PUFFS EVERY 6 HOURS IF NEEDED FOR WHEEZING.  Medication Adjustments for Surgery: Take/Use as prescribed     * albuterol 90 mcg/actuation inhaler  Commonly known as: Ventolin HFA  Inhale 2 puffs every 6 hours if needed for wheezing.  Medication Adjustments for Surgery: Take/Use as prescribed     escitalopram 10 mg tablet  Commonly known as: Lexapro  Medication Adjustments for Surgery: Take on the morning of surgery     hydrOXYzine HCL 25 mg tablet  Commonly known as: Atarax  Medication Adjustments for Surgery: Take on the morning of surgery  Notes to patient: If needed     melatonin 10 mg tablet  Medication Adjustments for Surgery: Take/Use as prescribed     oxyCODONE 5 mg immediate release tablet  Commonly known as: Roxicodone  Take 1 tablet (5 mg) by mouth every 6 hours if needed for severe pain (7 - 10) for up to 12 doses.  Medication Adjustments for Surgery: Take on the morning of surgery  Notes to patient: If needed           * This list has 2 medication(s) that are the same as other medications prescribed for you. Read the directions carefully, and ask your doctor or other care provider to review them with you.                                **Concerning above medication instructions, if medication is normally taken at night, continue normal schedule.**  **DO NOT TAKE NIGHT PRIOR AND MORNING OF SURGERY**    CONTACT SURGEON'S OFFICE IF YOU DEVELOP:  * Fever = 100.4 F   * New respiratory symptoms (e.g. cough, shortness of breath, respiratory distress, sore throat)  * Recent loss of taste or smell  *Flu like symptoms such as headache, fatigue or gastrointestinal symptoms  * You develop any open sores, shingles, burning or painful urination   AND/OR:  * You no longer wish to have the surgery.  * Any other personal circumstances change that may  lead to the need to cancel or defer this surgery.  *You were admitted to any hospital within one week of your planned procedure.    SMOKING:  *Quitting smoking can make a huge difference to your health and recovery from surgery.    *If you need help with quitting, call 1-584-QUIT-NOW.    THE DAY OF SURGERY:  *Do not eat any food after midnight the night before surgery.   *You must drink 13.5 ounces of clear liquids (i.e. water, black coffee (no milk or cream), tea, apple juice or electrolyte drinks (gatorade)) 2 hours before your arrival time.  *You may chew gum until 2 hours before your surgery    SURGICAL TIME  *You will be contacted between 2 p.m. and 6 p.m. the business day before your surgery with your arrival time.  *If you haven't received a call by 6pm, call [phone].  *Scheduled surgery times may change and you will be notified if this occurs-check your personal voicemail for any updates.    ON THE MORNING OF SURGERY:  *Wear comfortable, loose fitting clothing.   *Do not use moisturizers, creams, lotions or perfume.  *All jewelry and valuables should be left at home.  *Prosthetic devices such as contact lenses, hearing aids, dentures, eyelash extensions, hairpins and body piercing must be removed before surgery.    BRING WITH YOU:  *Photo ID and insurance card  *Current list of medicines and allergies  *Pacemaker/Defibrillator/Heart stent cards  *CPAP machine and mask  *Slings/splints/crutches  *Copy of your complete Advanced Directive/DHPOA-if applicable  *Neurostimulator implant remote    PARKING AND ARRIVAL:  *Check in at the Main Entrance desk and let them know you are here for surgery.  *You will be directed to the 2nd floor surgical waiting area.    AFTER OUTPATIENT SURGERY:  *A responsible adult MUST accompany you at the time of discharge and stay with you for 24 hours after your surgery.  *You may NOT drive yourself home after surgery.  *You may use a taxi or ride sharing service (Lyft, Uber) to  return home ONLY if you are accompanied by a friend or family member.  *Instructions for resuming your medications will be provided by your surgeon.       Preoperative Deep Breathing Exercises  Why it is important to do deep breathing exercises before my surgery?  Deep breathing exercises strengthen your breathing muscles.  This helps you to recover after your surgery and decreases the chance of breathing complications.  How are the deep breathing exercises done?  Sit straight with your back supported.  Breathe in deeply and slowly through your nose. Your lower rib cage should expand and your abdomen may move forward.  Hold that breath for 3 to 5 seconds.  Breathe out through pursed lips, slowly and completely.  Rest and repeat 10 times every hour while awake.  Rest longer if you become dizzy or lightheaded.

## 2025-04-18 ENCOUNTER — HOSPITAL ENCOUNTER (OUTPATIENT)
Facility: HOSPITAL | Age: 30
Setting detail: OUTPATIENT SURGERY
Discharge: HOME | End: 2025-04-18
Attending: STUDENT IN AN ORGANIZED HEALTH CARE EDUCATION/TRAINING PROGRAM | Admitting: STUDENT IN AN ORGANIZED HEALTH CARE EDUCATION/TRAINING PROGRAM
Payer: COMMERCIAL

## 2025-04-18 ENCOUNTER — ANESTHESIA EVENT (OUTPATIENT)
Dept: OPERATING ROOM | Facility: HOSPITAL | Age: 30
End: 2025-04-18
Payer: COMMERCIAL

## 2025-04-18 ENCOUNTER — ANESTHESIA (OUTPATIENT)
Dept: OPERATING ROOM | Facility: HOSPITAL | Age: 30
End: 2025-04-18
Payer: COMMERCIAL

## 2025-04-18 VITALS
WEIGHT: 197.75 LBS | DIASTOLIC BLOOD PRESSURE: 79 MMHG | SYSTOLIC BLOOD PRESSURE: 117 MMHG | BODY MASS INDEX: 27.69 KG/M2 | RESPIRATION RATE: 18 BRPM | OXYGEN SATURATION: 100 % | HEIGHT: 71 IN | TEMPERATURE: 96.8 F | HEART RATE: 69 BPM

## 2025-04-18 DIAGNOSIS — Z30.2 ADMISSION FOR VASECTOMY: ICD-10-CM

## 2025-04-18 DIAGNOSIS — N43.3 HYDROCELE IN ADULT: Primary | ICD-10-CM

## 2025-04-18 PROCEDURE — 2500000005 HC RX 250 GENERAL PHARMACY W/O HCPCS

## 2025-04-18 PROCEDURE — 7100000010 HC PHASE TWO TIME - EACH INCREMENTAL 1 MINUTE: Performed by: STUDENT IN AN ORGANIZED HEALTH CARE EDUCATION/TRAINING PROGRAM

## 2025-04-18 PROCEDURE — 3600000007 HC OR TIME - EACH INCREMENTAL 1 MINUTE - PROCEDURE LEVEL TWO: Performed by: STUDENT IN AN ORGANIZED HEALTH CARE EDUCATION/TRAINING PROGRAM

## 2025-04-18 PROCEDURE — 7100000002 HC RECOVERY ROOM TIME - EACH INCREMENTAL 1 MINUTE: Performed by: STUDENT IN AN ORGANIZED HEALTH CARE EDUCATION/TRAINING PROGRAM

## 2025-04-18 PROCEDURE — 3600000002 HC OR TIME - INITIAL BASE CHARGE - PROCEDURE LEVEL TWO: Performed by: STUDENT IN AN ORGANIZED HEALTH CARE EDUCATION/TRAINING PROGRAM

## 2025-04-18 PROCEDURE — 2500000005 HC RX 250 GENERAL PHARMACY W/O HCPCS: Performed by: STUDENT IN AN ORGANIZED HEALTH CARE EDUCATION/TRAINING PROGRAM

## 2025-04-18 PROCEDURE — 3700000002 HC GENERAL ANESTHESIA TIME - EACH INCREMENTAL 1 MINUTE: Performed by: STUDENT IN AN ORGANIZED HEALTH CARE EDUCATION/TRAINING PROGRAM

## 2025-04-18 PROCEDURE — 2720000007 HC OR 272 NO HCPCS: Performed by: STUDENT IN AN ORGANIZED HEALTH CARE EDUCATION/TRAINING PROGRAM

## 2025-04-18 PROCEDURE — 2500000004 HC RX 250 GENERAL PHARMACY W/ HCPCS (ALT 636 FOR OP/ED)

## 2025-04-18 PROCEDURE — 55250 REMOVAL OF SPERM DUCT(S): CPT | Performed by: STUDENT IN AN ORGANIZED HEALTH CARE EDUCATION/TRAINING PROGRAM

## 2025-04-18 PROCEDURE — 2500000001 HC RX 250 WO HCPCS SELF ADMINISTERED DRUGS (ALT 637 FOR MEDICARE OP): Performed by: ANESTHESIOLOGY

## 2025-04-18 PROCEDURE — 7100000001 HC RECOVERY ROOM TIME - INITIAL BASE CHARGE: Performed by: STUDENT IN AN ORGANIZED HEALTH CARE EDUCATION/TRAINING PROGRAM

## 2025-04-18 PROCEDURE — 3700000001 HC GENERAL ANESTHESIA TIME - INITIAL BASE CHARGE: Performed by: STUDENT IN AN ORGANIZED HEALTH CARE EDUCATION/TRAINING PROGRAM

## 2025-04-18 PROCEDURE — 2500000004 HC RX 250 GENERAL PHARMACY W/ HCPCS (ALT 636 FOR OP/ED): Performed by: STUDENT IN AN ORGANIZED HEALTH CARE EDUCATION/TRAINING PROGRAM

## 2025-04-18 PROCEDURE — 55060 REPAIR OF HYDROCELE: CPT | Performed by: STUDENT IN AN ORGANIZED HEALTH CARE EDUCATION/TRAINING PROGRAM

## 2025-04-18 PROCEDURE — 7100000009 HC PHASE TWO TIME - INITIAL BASE CHARGE: Performed by: STUDENT IN AN ORGANIZED HEALTH CARE EDUCATION/TRAINING PROGRAM

## 2025-04-18 RX ORDER — CEFAZOLIN 1 G/1
INJECTION, POWDER, FOR SOLUTION INTRAVENOUS AS NEEDED
Status: DISCONTINUED | OUTPATIENT
Start: 2025-04-18 | End: 2025-04-18

## 2025-04-18 RX ORDER — CEPHALEXIN 500 MG/1
500 CAPSULE ORAL 2 TIMES DAILY
Qty: 6 CAPSULE | Refills: 0 | Status: SHIPPED | OUTPATIENT
Start: 2025-04-18 | End: 2025-04-21

## 2025-04-18 RX ORDER — SODIUM CHLORIDE 0.9 G/100ML
INJECTION, SOLUTION IRRIGATION AS NEEDED
Status: DISCONTINUED | OUTPATIENT
Start: 2025-04-18 | End: 2025-04-18 | Stop reason: HOSPADM

## 2025-04-18 RX ORDER — OXYCODONE HYDROCHLORIDE 5 MG/1
5 TABLET ORAL EVERY 4 HOURS PRN
Status: DISCONTINUED | OUTPATIENT
Start: 2025-04-18 | End: 2025-04-18 | Stop reason: HOSPADM

## 2025-04-18 RX ORDER — LABETALOL HYDROCHLORIDE 5 MG/ML
5 INJECTION, SOLUTION INTRAVENOUS ONCE AS NEEDED
Status: DISCONTINUED | OUTPATIENT
Start: 2025-04-18 | End: 2025-04-18 | Stop reason: HOSPADM

## 2025-04-18 RX ORDER — SODIUM CHLORIDE, SODIUM LACTATE, POTASSIUM CHLORIDE, CALCIUM CHLORIDE 600; 310; 30; 20 MG/100ML; MG/100ML; MG/100ML; MG/100ML
100 INJECTION, SOLUTION INTRAVENOUS CONTINUOUS
Status: DISCONTINUED | OUTPATIENT
Start: 2025-04-18 | End: 2025-04-18 | Stop reason: HOSPADM

## 2025-04-18 RX ORDER — LIDOCAINE HYDROCHLORIDE 10 MG/ML
0.1 INJECTION, SOLUTION EPIDURAL; INFILTRATION; INTRACAUDAL; PERINEURAL ONCE
Status: DISCONTINUED | OUTPATIENT
Start: 2025-04-18 | End: 2025-04-18 | Stop reason: HOSPADM

## 2025-04-18 RX ORDER — DEXTROMETHORPHAN HYDROBROMIDE, GUAIFENESIN 5; 100 MG/5ML; MG/5ML
650 LIQUID ORAL EVERY 8 HOURS PRN
Qty: 15 TABLET | Refills: 1 | Status: SHIPPED | OUTPATIENT
Start: 2025-04-18

## 2025-04-18 RX ORDER — FENTANYL CITRATE 50 UG/ML
INJECTION, SOLUTION INTRAMUSCULAR; INTRAVENOUS AS NEEDED
Status: DISCONTINUED | OUTPATIENT
Start: 2025-04-18 | End: 2025-04-18

## 2025-04-18 RX ORDER — ONDANSETRON HYDROCHLORIDE 2 MG/ML
4 INJECTION, SOLUTION INTRAVENOUS ONCE AS NEEDED
Status: DISCONTINUED | OUTPATIENT
Start: 2025-04-18 | End: 2025-04-18 | Stop reason: HOSPADM

## 2025-04-18 RX ORDER — LIDOCAINE HYDROCHLORIDE 20 MG/ML
INJECTION, SOLUTION EPIDURAL; INFILTRATION; INTRACAUDAL; PERINEURAL AS NEEDED
Status: DISCONTINUED | OUTPATIENT
Start: 2025-04-18 | End: 2025-04-18

## 2025-04-18 RX ORDER — PROPOFOL 10 MG/ML
INJECTION, EMULSION INTRAVENOUS AS NEEDED
Status: DISCONTINUED | OUTPATIENT
Start: 2025-04-18 | End: 2025-04-18

## 2025-04-18 RX ORDER — BUPIVACAINE HYDROCHLORIDE 5 MG/ML
INJECTION, SOLUTION PERINEURAL AS NEEDED
Status: DISCONTINUED | OUTPATIENT
Start: 2025-04-18 | End: 2025-04-18 | Stop reason: HOSPADM

## 2025-04-18 RX ORDER — HYDRALAZINE HYDROCHLORIDE 20 MG/ML
5 INJECTION INTRAMUSCULAR; INTRAVENOUS EVERY 30 MIN PRN
Status: DISCONTINUED | OUTPATIENT
Start: 2025-04-18 | End: 2025-04-18 | Stop reason: HOSPADM

## 2025-04-18 RX ORDER — MIDAZOLAM HYDROCHLORIDE 1 MG/ML
INJECTION INTRAMUSCULAR; INTRAVENOUS AS NEEDED
Status: DISCONTINUED | OUTPATIENT
Start: 2025-04-18 | End: 2025-04-18

## 2025-04-18 RX ORDER — ONDANSETRON HYDROCHLORIDE 2 MG/ML
INJECTION, SOLUTION INTRAVENOUS AS NEEDED
Status: DISCONTINUED | OUTPATIENT
Start: 2025-04-18 | End: 2025-04-18

## 2025-04-18 RX ORDER — SODIUM CHLORIDE, SODIUM LACTATE, POTASSIUM CHLORIDE, CALCIUM CHLORIDE 600; 310; 30; 20 MG/100ML; MG/100ML; MG/100ML; MG/100ML
INJECTION, SOLUTION INTRAVENOUS CONTINUOUS PRN
Status: DISCONTINUED | OUTPATIENT
Start: 2025-04-18 | End: 2025-04-18

## 2025-04-18 RX ORDER — CEFAZOLIN SODIUM 2 G/50ML
2 SOLUTION INTRAVENOUS ONCE
Status: DISCONTINUED | OUTPATIENT
Start: 2025-04-18 | End: 2025-04-18 | Stop reason: HOSPADM

## 2025-04-18 RX ORDER — ALBUTEROL SULFATE 0.83 MG/ML
2.5 SOLUTION RESPIRATORY (INHALATION) ONCE AS NEEDED
Status: DISCONTINUED | OUTPATIENT
Start: 2025-04-18 | End: 2025-04-18 | Stop reason: HOSPADM

## 2025-04-18 RX ADMIN — DEXAMETHASONE SODIUM PHOSPHATE 4 MG: 4 INJECTION, SOLUTION INTRAMUSCULAR; INTRAVENOUS at 10:08

## 2025-04-18 RX ADMIN — MIDAZOLAM HYDROCHLORIDE 2 MG: 1 INJECTION, SOLUTION INTRAMUSCULAR; INTRAVENOUS at 09:58

## 2025-04-18 RX ADMIN — GLYCOPYRROLATE 0.3 MCG: 0.2 INJECTION, SOLUTION INTRAMUSCULAR; INTRAVENOUS at 10:15

## 2025-04-18 RX ADMIN — LIDOCAINE HYDROCHLORIDE 60 MG: 20 INJECTION, SOLUTION EPIDURAL; INFILTRATION; INTRACAUDAL; PERINEURAL at 10:02

## 2025-04-18 RX ADMIN — FENTANYL CITRATE 50 MCG: 50 INJECTION, SOLUTION INTRAMUSCULAR; INTRAVENOUS at 10:10

## 2025-04-18 RX ADMIN — FENTANYL CITRATE 50 MCG: 50 INJECTION, SOLUTION INTRAMUSCULAR; INTRAVENOUS at 10:38

## 2025-04-18 RX ADMIN — ONDANSETRON 4 MG: 2 INJECTION, SOLUTION INTRAMUSCULAR; INTRAVENOUS at 10:45

## 2025-04-18 RX ADMIN — Medication 30 MG: at 10:02

## 2025-04-18 RX ADMIN — SODIUM CHLORIDE, POTASSIUM CHLORIDE, SODIUM LACTATE AND CALCIUM CHLORIDE: 600; 310; 30; 20 INJECTION, SOLUTION INTRAVENOUS at 09:58

## 2025-04-18 RX ADMIN — PROPOFOL 150 MG: 10 INJECTION, EMULSION INTRAVENOUS at 10:02

## 2025-04-18 RX ADMIN — PROPOFOL 50 MG: 10 INJECTION, EMULSION INTRAVENOUS at 10:03

## 2025-04-18 RX ADMIN — CEFAZOLIN 2 G: 1 INJECTION, POWDER, FOR SOLUTION INTRAMUSCULAR; INTRAVENOUS at 10:07

## 2025-04-18 RX ADMIN — OXYCODONE HYDROCHLORIDE 5 MG: 5 TABLET ORAL at 11:40

## 2025-04-18 ASSESSMENT — PAIN SCALES - GENERAL
PAINLEVEL_OUTOF10: 5 - MODERATE PAIN
PAINLEVEL_OUTOF10: 3
PAINLEVEL_OUTOF10: 0 - NO PAIN
PAINLEVEL_OUTOF10: 0 - NO PAIN
PAINLEVEL_OUTOF10: 5 - MODERATE PAIN
PAINLEVEL_OUTOF10: 0 - NO PAIN
PAINLEVEL_OUTOF10: 3
PAINLEVEL_OUTOF10: 0 - NO PAIN

## 2025-04-18 ASSESSMENT — PAIN DESCRIPTION - LOCATION: LOCATION: SCROTUM

## 2025-04-18 ASSESSMENT — PAIN - FUNCTIONAL ASSESSMENT
PAIN_FUNCTIONAL_ASSESSMENT: 0-10

## 2025-04-18 NOTE — H&P
29 y.o. male who presented for vasectomy evaluation, the patient is  with 0 children. I had a long and extensive discussion with the patient regarding vasectomy. I informed him that he should consider this procedure as permanent and he should be 100% sure about proceeding with it. I explained to him in detail the steps of the vasectomy, I also had a long discussion with him regarding the possible side effects including infection, hematoma, bleeding, chronic pain, testicular congestion, injury to surrounding structure. We also discussed the multiple studies linking vasectomy to prostate cancer I explained to him the correlation between this procedure and prostate cancer. The patient verbalized understanding of all the risks and benefits and would like to proceed. I explained to him that there is a small chance of spontaneous return of the semen because of reconnection of his vas ending. I also told him that he is not close to the sterile unless it is proven by a post vasectomy semen analysis after around 3 months. I also explained to him that some man with a much longer to clear his semen from there, this most could take up to 6 months during which he would be still able to proceed.     Left hydrocele noted during physical exam today.      The most recent Scrotal US, conducted on 08/06/2024, revealed:  Asymmetric thickening and increased vascularity by color Doppler  imaging involving left spermatic cord, correlate for funiculitis.  Large left hydrocele.      Small bilateral epididymal head cysts.        Review of Systems     All systems were reviewed. Anything negative was noted in the HPI.     Objective   Physical Exam  Genitourinary:     Pubic Area: No rash.       Penis: Normal and circumcised. No hypospadias.       Testes:         Right: Mass, tenderness, swelling, testicular hydrocele or varicocele not present. Right testis is descended.         Left: Mass, tenderness, swelling, testicular hydrocele or  varicocele not present. Left testis is descended.      Epididymis:      Right: Not inflamed or enlarged. No mass or tenderness.      Left: Not inflamed or enlarged. No mass or tenderness.      Large left hydrocele      General: Well developed, well nourished, alert and cooperative, appears in no acute distress   Eyes: Non-injected conjunctiva, sclera clear, no proptosis   Cardiac: Extremities are warm and well perfused. No edema, cyanosis or pallor   Lungs: Breathing is easy, non-labored. Speaking in clear and complete sentences. Normal diaphragmatic movement   MSK: Ambulatory with steady gait, unassisted   Neuro: Alert and oriented to person, place, and time   Psych: Demonstrates good judgment and reason, without hallucinations, abnormal affect or abnormal behaviors   Skin: No obvious lesions, no rashes       No CVA tenderness bilaterally   No suprapubic pain or discomfort         Medical History        Past Medical History:   Diagnosis Date    Abdominal infection (Multi) 03/25/2020     Last Assessment & Plan:    Formatting of this note might be different from the original.      Assessment: Intraoperative cultures growing bacteroides species on aerobic cultures and GNB. Blood cultures positive for Bacteroides species and Clostridium perfringens. Repeat Blood cultures on 3/22 showing NGTD. Blood cultures 3/16 +Clostridium perfringens. PICC placed 4/1.    PLAN:    -Follow up repeat    Acute blood loss anemia 03/16/2020     Last Assessment & Plan:    Formatting of this note might be different from the original.      Assessment: EBL 200ml from OR 3/18 requiring  6U FFP   PLAN:    -Trend CBC   -Transfuse for Hgb <7    Acute necrotizing pancreatitis ([facility]) 03/16/2020     Last Assessment & Plan:    Formatting of this note might be different from the original.   Assessment: s/p exp lap with washout and I&D. Patient no longer febrile at night, WBC stable   PLAN:    - Bacteroides from intra-abdominal cultures   - Continue  Zosyn per ID    Asthma ([facility])      Left leg swelling 03/16/2020     Last Assessment & Plan:    Formatting of this note might be different from the original.      Assessment: Worse edema on left side versus right.   PLAN:    -- DVT scan today    Moderate protein-calorie malnutrition (Multi) 03/26/2020     Last Assessment & Plan:    Formatting of this note might be different from the original.      Assessment: Corpak incidentally removed 3/25   PLAN:    -Continue GI soft diet    -Appreciate nutrition recs    Obese 09/07/2019    Severe protein-calorie malnutrition (Multi) 04/10/2020     Last Assessment & Plan:    Formatting of this note might be different from the original.   Assessment: On enteral nutrition. Minimal PO intake    PLAN:    -TF to goal   -GI soft diet    -Creon 36 with meals and snacks    -Appreciate nutrition recs               Surgical History         Past Surgical History:   Procedure Laterality Date    EXPLORATORY LAPAROTOMY        US GUIDED ABSCESS DRAIN   05/23/2022     US GUIDED ABSCESS DRAIN 5/23/2022 [facility]ICAL [facility]                  Assessment/Plan  Vasectomy Evaluation, large left hydrocele      29 y.o. male who presents for the above condition, Vasectomy Evaluation     Patient presents today for evaluation of vasectomy. We had an extensive discussion about the procedure and post-procedure protocol. We discussed that vasectomy is intended to be a permanent form of contraception. There are options for fertility post vasectomy, including vasectomy reversal and sperm retrieval but these are not always successful and can be rather expensive.  We highlighted that it is not an immediate form of contraception, and that another form is required until vas occlusion is confirmed with a post-vasectomy semen analysis. We recommend that the patient refrain from ejaculation for 1 week post-procedure and we would be checking a post-vasectomy semen analysis in 2-3 months, or 15-20 ejaculates. Need for  repeat vasectomy is very low, <1%. There is a very small risk for pregnancy after vasectomy (1 in 2,000). We define a successful vasectomy by achieving azoospermia or less than 100,000 non-motile sperm on post-vasectomy semen analysis.  We discussed that the procedure would be done in the office under local anesthesia. Complications were discussed including but not limited to pain, which can be chronic in nature, bleeding/hematoma formation, and infection. We recommended ice and rest for the next 48-72hrs. Patient may be prescribed an anti-inflammatory for pain control. Patient is instructed to refrain from strenuous activity for 2 weeks.  No barriers to learning were identified. After all of the patient’s questions were satisfactorily answered, he expressed understanding of the risks of surgery and wishes to proceed with vasectomy.     We had a very long and extensive discussion with the patient regarding the pathophysiology, differential diagnosis, risk factor, management, natural history, incidence and diagnostic work-up of the condition.  We discussed at length option of management including hydrocelectomy.  Discussed the risk, benefits, potential complications adverse events.  He verbalized understanding and would like to proceed.We had a long and excess discussion with the patient regarding pathophysiology, differential diagnosis, risk factor, sick condition and management of hydrocele. Explained to him that the only permanent solution for hydrocele is to proceed with a surgical hydrocelectomy. I discussed the step-by-step details of hydrocelectomy. We also went at length into the discussion of the risk, benefit, potential complication, adverse events including but not limited to infection, bleeding, pain, edema and swelling, injury to the surrounding structures. Patient was understanding like to proceed with hydrocelectomy.        Plan:  - Schedule patient for Vasectomy and left Hydrocelectomy

## 2025-04-18 NOTE — ANESTHESIA POSTPROCEDURE EVALUATION
Patient: Que Cha    Procedure Summary       Date: 04/18/25 Room / Location: [facility] A OR 09 / Virtual [facility] OR    Anesthesia Start: 0958 Anesthesia Stop: 1106    Procedures:       Left Hydrocelectomy (Left: Scrotum)      Bilateral Vasectomy (Bilateral: Scrotum) Diagnosis:       Hydrocele in adult      Admission for vasectomy      (Hydrocele in adult [N43.3])      (Admission for vasectomy [Z30.2])    Surgeons: Collin Caraballo MD MPH Responsible Provider: Que Fernando MD    Anesthesia Type: general ASA Status: 2            Anesthesia Type: general    Vitals Value Taken Time   /88 04/18/25 11:31   Temp 36 °C (96.8 °F) 04/18/25 11:03   Pulse 68 04/18/25 11:38   Resp 18 04/18/25 11:30   SpO2 97 % 04/18/25 11:38   Vitals shown include unfiled device data.    Anesthesia Post Evaluation    Patient participation: complete - patient participated  Level of consciousness: awake  Pain management: satisfactory to patient  Airway patency: patent  Cardiovascular status: acceptable and hemodynamically stable  Respiratory status: acceptable and nonlabored ventilation  Hydration status: balanced  Postoperative Nausea and Vomiting: none        No notable events documented.

## 2025-04-18 NOTE — ANESTHESIA PREPROCEDURE EVALUATION
Patient: Que Cha    Procedure Information       Date/Time: 04/18/25 1015    Procedures:       Left Hydrocelectomy (Left)      Bilateral Vasectomy (Bilateral)    Location: [facility] OR 09 / [facility] OR    Surgeons: Collin Caraballo MD MPH          31 yo M hx THC use, controlled asthma, prior pancreatitis, s/p ventral hernia repair 8/2024 (he appreciated the multimodal approach to pain management with ketamine intra-op given his THC use)    Relevant Problems   Pulmonary   (+) Asthma      Neuro   (+) Depression      Hematology   (+) Anemia       Clinical information reviewed:   Tobacco  Allergies  Meds   Med Hx  Surg Hx   Fam Hx  Soc Hx        NPO Detail:  NPO/Void Status  Carbohydrate Drink Given Prior to Surgery? : N  Date of Last Liquid: 04/18/25  Time of Last Liquid: 0200  Date of Last Solid: 04/17/25  Time of Last Solid: 2330  Last Intake Type: Clear fluids  Time of Last Void: 0900         Physical Exam    Airway  Mallampati: II  TM distance: >3 FB  Neck ROM: full  Mouth opening: 3 or more finger widths     Cardiovascular Rate: normal     Dental - normal exam     Pulmonary - normal exam   Abdominal            Anesthesia Plan    History of general anesthesia?: yes  History of complications of general anesthesia?: no    ASA 2     general     intravenous induction   Postoperative pain plan includes opioids.  Anesthetic plan and risks discussed with patient.

## 2025-04-18 NOTE — OP NOTE
Left Hydrocelectomy (L), Bilateral Vasectomy (B) Operative Note     Date: 2025  OR Location: [facility] A OR    Name: Que Cha : 1995, Age: 30 y.o., MRN: [id number], Sex: male    Diagnosis  Pre-op Diagnosis      * Hydrocele in adult [N43.3]     * Admission for vasectomy [Z30.2] Post-op Diagnosis     * Hydrocele in adult [N43.3]     * Admission for vasectomy [Z30.2]     Procedures  Left Hydrocelectomy  36467 - TX RPR TUNICA VAGINALIS HYDROCELE BOTTLE TYPE    Bilateral Vasectomy  41533 - TX VASECTOMY UNI/BI SPX W/POSTOP SEMEN EXAMS      Surgeons      * Collin Caraballo - Primary    Resident/Fellow/Other Assistant:  Surgeons and Role:  * No surgeons found with a matching role *    Staff:   Circulator: Carli Carter Person: Sandy    Anesthesia Staff: Anesthesiologist: Que Fernando MD  CRNA: KAILEE Be-CRNA    Procedure Summary  Anesthesia: Anesthesia type not filed in the log.  ASA: ASA status not filed in the log.  Estimated Blood Loss: 10mL  Intra-op Medications: Administrations occurring from 1015 to 1145 on 25:  * No intraprocedure medications in log *        Specimen: specimens collected       Indications: Que Cha is an 30 y.o. male who is having surgery for Hydrocele in adult [N43.3]  Admission for vasectomy [Z30.2].     The patient was seen in the preoperative area. The risks, benefits, complications, treatment options, non-operative alternatives, expected recovery and outcomes were discussed with the patient. The possibilities of reaction to medication, pulmonary aspiration, injury to surrounding structures, bleeding, recurrent infection, the need for additional procedures, failure to diagnose a condition, and creating a complication requiring transfusion or operation were discussed with the patient. The patient concurred with the proposed plan, giving informed consent.  The site of surgery was properly noted/marked if necessary per policy. The patient has been  actively warmed in preoperative area. Preoperative antibiotics have been ordered and given within 1 hours of incision. Venous thrombosis prophylaxis have been ordered including bilateral sequential compression devices    Procedure Details:     Under sterile conditions, lidocaine without epinephrine, total 2cc, was administered to provide local skin  anesthesia and a regional vasal block bilaterally. Next, the scalpel vasectomy approach was used to fix and  expose the left vas under the left scrotal hemiscrotum, 1 cm below the base of the penis. The vas was  hemitransected and thermal cautery applied to a 1 cm length inside the lumen of the prostatic end of the cut vas  until blanching occurred. The vas was then completely transected and fascial interposition achieved with one  metal clip over the prostatic end. The area was inspected thoroughly and good  hemostasis was noted before the vas was reduced back into the scrotum. The procedure was repeated on the  right  vas. Estimated blood loss was <2cc. Skin was dressed with sterile gauze. Tape was applied to keep the  bandage on overnight, and a scrotal support will be placed. The patient tolerated the procedure well.     Subsequently, we made 3.5 cm transverse incision over the left hemiscrotum and dissected down to the hydrocele.  The hydrocele was subsequently delivered, and it was drained of yellow fluid of about 30cc . The excess hydrocele sac was resected.  Using a 3-0 Vicryl locked running sutures, we imbricated the hydrocele sac in a bottle neck fashion.  We subsequently used a running 3-0 Vicryl to close the dartos and a 3-0 Vicryl subcuticular stitch to close the skin.  This concluded the end of the procedure.  Dermabond was applied to the incision.  Fluffs and jockstrap were provided.  The patient was awoken from anesthesia and taken to PACU in stable condition.       Complications:  None; patient tolerated the procedure well.    Disposition: PACU -  hemodynamically stable.  Condition: stable         Attending Attestation:     Collin Caraballo  Phone Number: [phone]

## 2025-04-18 NOTE — ANESTHESIA PROCEDURE NOTES
Airway  Date/Time: 4/18/2025 10:05 AM  Reason: elective    Airway not difficult    Staffing  Performed: SRNA   Authorized by: Que Fernando MD    Performed by: Paz Gibbons  Patient location during procedure: OR    Patient Condition  Indications for airway management: anesthesia  Patient position: sniffing  Planned trial extubation  Sedation level: deep     Final Airway Details   Preoxygenated: yes  Final airway type: supraglottic airway  Successful airway: classic  Size: 5  Number of attempts at approach: 1    Additional Comments  Atraumatic; dentition intact.

## 2025-05-22 ENCOUNTER — APPOINTMENT (OUTPATIENT)
Dept: UROLOGY | Facility: HOSPITAL | Age: 30
End: 2025-05-22
Payer: COMMERCIAL

## 2025-08-02 ENCOUNTER — PATIENT MESSAGE (OUTPATIENT)
Dept: UROLOGY | Facility: HOSPITAL | Age: 30
End: 2025-08-02
Payer: COMMERCIAL

## 2025-08-04 DIAGNOSIS — Z98.52 HX OF VASECTOMY: Primary | ICD-10-CM

## 2025-09-08 ENCOUNTER — APPOINTMENT (OUTPATIENT)
Dept: PRIMARY CARE | Facility: CLINIC | Age: 30
End: 2025-09-08
Payer: COMMERCIAL

## 2025-09-15 ENCOUNTER — APPOINTMENT (OUTPATIENT)
Dept: DERMATOLOGY | Facility: CLINIC | Age: 30
End: 2025-09-15
Payer: COMMERCIAL

## 2025-09-24 ENCOUNTER — APPOINTMENT (OUTPATIENT)
Dept: UROLOGY | Facility: HOSPITAL | Age: 30
End: 2025-09-24
Payer: COMMERCIAL

## (undated) DEVICE — SEAL, UNIVERSAL 5-8MM  XI

## (undated) DEVICE — DRAPE, ARM XI

## (undated) DEVICE — CANNULA SEAL, STAPLER, DAVINCI XI

## (undated) DEVICE — APPLIER,  LIGACLIP MULTI CLIP, 30 MED 11 1/2

## (undated) DEVICE — SOLUTION, IRRIGATION, SODIUM CHLORIDE 0.9%, 1000 ML, POUR BOTTLE

## (undated) DEVICE — CARE KIT, LAPAROSCOPIC, ADVANCED

## (undated) DEVICE — ADHESIVE, SKIN, DERMABOND ADVANCED, 15CM, PEN-STYLE

## (undated) DEVICE — TUBE SET, PNEUMOLAR HEATED, SMOKE EVACU, HIGH-FLOW

## (undated) DEVICE — SUTURE, VICRYL, 0, 27 IN, UR-6, VIOLET

## (undated) DEVICE — TOWEL PACK, STERILE, 4/PACK, BLUE

## (undated) DEVICE — SPONGE, LAP, XRAY DECT, 18IN X 18IN, W/MASTER DMT, STERILE

## (undated) DEVICE — ADHESIVE, SKIN, LIQUIBAND EXCEED

## (undated) DEVICE — LUBRICANT, ELECTROLUBE, F/ELECTRODE TIPS

## (undated) DEVICE — Device

## (undated) DEVICE — COVER, TIP HOT SHEARS ENDOWRIST

## (undated) DEVICE — ELECTRODE, ELECTROSURGICAL, NEEDLE, 1.1 IN, LF

## (undated) DEVICE — GLOVE, SURGEON, PREMIERPRO PI, MICRO, SZ-7.5, PF, WH

## (undated) DEVICE — SUPPORTER, ATHLETIC, ADULT, LARGE

## (undated) DEVICE — ABDOMINAL BINDER, 3-PANEL, 9, 72 - 84""

## (undated) DEVICE — OBTURATOR, BLADELESS , SU

## (undated) DEVICE — SUTURE, MONOCRYL, 4-0, 18 IN, PS2, UNDYED

## (undated) DEVICE — GLOVE, SURGICAL, PROTEXIS PI BLUE W/NEUTHERA, 8.0, PF, LF

## (undated) DEVICE — SUTURE, CHROMIC GUT, 3-0, SH 27"

## (undated) DEVICE — PREP TRAY, VAGINAL

## (undated) DEVICE — DRAPE, COLUMN, DAVINCI XI

## (undated) DEVICE — DRESSING, GAUZE, 16 PLY, 4 X 4 IN, STERILE

## (undated) DEVICE — SOLUTION, IRRIGATION, STERILE WATER, 1000 ML, POUR BOTTLE

## (undated) DEVICE — GLOVE, SURGICAL, PROTEXIS PI MICRO, 7.5, PF, LF

## (undated) DEVICE — TROCAR SYSTEM, BALLOON, KII GELPORT, 12 X 100MM

## (undated) DEVICE — BANDAGE, STRETCH, CONFORM, 2 IN X 4.1 YD, STERILE